# Patient Record
Sex: FEMALE | Race: OTHER | HISPANIC OR LATINO
[De-identification: names, ages, dates, MRNs, and addresses within clinical notes are randomized per-mention and may not be internally consistent; named-entity substitution may affect disease eponyms.]

---

## 2019-11-04 ENCOUNTER — APPOINTMENT (OUTPATIENT)
Dept: MATERNAL FETAL MEDICINE | Facility: CLINIC | Age: 39
End: 2019-11-04
Payer: COMMERCIAL

## 2019-11-04 ENCOUNTER — APPOINTMENT (OUTPATIENT)
Dept: ANTEPARTUM | Facility: CLINIC | Age: 39
End: 2019-11-04
Payer: COMMERCIAL

## 2019-11-04 ENCOUNTER — ASOB RESULT (OUTPATIENT)
Age: 39
End: 2019-11-04

## 2019-11-04 PROCEDURE — G0108 DIAB MANAGE TRN  PER INDIV: CPT

## 2019-11-04 PROCEDURE — 76811 OB US DETAILED SNGL FETUS: CPT

## 2019-11-12 ENCOUNTER — OUTPATIENT (OUTPATIENT)
Dept: OUTPATIENT SERVICES | Age: 39
LOS: 1 days | Discharge: HOME | End: 2019-11-12

## 2019-11-13 ENCOUNTER — APPOINTMENT (OUTPATIENT)
Dept: PEDIATRIC CARDIOLOGY | Facility: CLINIC | Age: 39
End: 2019-11-13
Payer: COMMERCIAL

## 2019-11-13 PROCEDURE — 99241 OFFICE CONSULTATION NEW/ESTAB PATIENT 15 MIN: CPT | Mod: 25

## 2019-11-13 PROCEDURE — 93325 DOPPLER ECHO COLOR FLOW MAPG: CPT | Mod: 59

## 2019-11-13 PROCEDURE — 76825 ECHO EXAM OF FETAL HEART: CPT

## 2019-11-13 PROCEDURE — 76820 UMBILICAL ARTERY ECHO: CPT

## 2019-11-13 PROCEDURE — 76827 ECHO EXAM OF FETAL HEART: CPT

## 2019-11-25 ENCOUNTER — APPOINTMENT (OUTPATIENT)
Dept: MATERNAL FETAL MEDICINE | Facility: CLINIC | Age: 39
End: 2019-11-25
Payer: COMMERCIAL

## 2019-11-25 ENCOUNTER — ASOB RESULT (OUTPATIENT)
Age: 39
End: 2019-11-25

## 2019-11-25 PROCEDURE — G0108 DIAB MANAGE TRN  PER INDIV: CPT

## 2019-11-25 RX ORDER — METFORMIN HYDROCHLORIDE 850 MG/1
850 TABLET, COATED ORAL TWICE DAILY
Qty: 1 | Refills: 3 | Status: ACTIVE | COMMUNITY
Start: 2019-11-25 | End: 1900-01-01

## 2019-11-25 RX ORDER — INSULIN LISPRO 100 [IU]/ML
100 INJECTION, SOLUTION INTRAVENOUS; SUBCUTANEOUS
Qty: 1 | Refills: 2 | Status: ACTIVE | COMMUNITY
Start: 2019-11-25 | End: 1900-01-01

## 2019-11-25 RX ORDER — PEN NEEDLE, DIABETIC 29 G X1/2"
32G X 4 MM NEEDLE, DISPOSABLE MISCELLANEOUS
Qty: 1 | Refills: 2 | Status: ACTIVE | COMMUNITY
Start: 2019-11-25 | End: 1900-01-01

## 2019-11-25 RX ORDER — BLOOD SUGAR DIAGNOSTIC
STRIP MISCELLANEOUS
Qty: 1 | Refills: 5 | Status: ACTIVE | COMMUNITY
Start: 2019-11-25 | End: 1900-01-01

## 2019-11-27 ENCOUNTER — TRANSCRIPTION ENCOUNTER (OUTPATIENT)
Age: 39
End: 2019-11-27

## 2019-12-09 ENCOUNTER — ASOB RESULT (OUTPATIENT)
Age: 39
End: 2019-12-09

## 2019-12-09 ENCOUNTER — APPOINTMENT (OUTPATIENT)
Dept: ANTEPARTUM | Facility: CLINIC | Age: 39
End: 2019-12-09
Payer: COMMERCIAL

## 2019-12-09 ENCOUNTER — APPOINTMENT (OUTPATIENT)
Dept: MATERNAL FETAL MEDICINE | Facility: CLINIC | Age: 39
End: 2019-12-09
Payer: COMMERCIAL

## 2019-12-09 PROCEDURE — 76816 OB US FOLLOW-UP PER FETUS: CPT

## 2019-12-09 PROCEDURE — G0108 DIAB MANAGE TRN  PER INDIV: CPT

## 2019-12-10 RX ORDER — INSULIN HUMAN 100 [IU]/ML
100 INJECTION, SUSPENSION SUBCUTANEOUS
Qty: 1 | Refills: 4 | Status: ACTIVE | COMMUNITY
Start: 2019-11-25 | End: 1900-01-01

## 2019-12-23 ENCOUNTER — APPOINTMENT (OUTPATIENT)
Dept: MATERNAL FETAL MEDICINE | Facility: CLINIC | Age: 39
End: 2019-12-23
Payer: COMMERCIAL

## 2019-12-23 ENCOUNTER — ASOB RESULT (OUTPATIENT)
Age: 39
End: 2019-12-23

## 2019-12-23 PROCEDURE — G0108 DIAB MANAGE TRN  PER INDIV: CPT

## 2019-12-23 RX ORDER — BLOOD SUGAR DIAGNOSTIC
STRIP MISCELLANEOUS 4 TIMES DAILY
Qty: 1 | Refills: 2 | Status: ACTIVE | COMMUNITY
Start: 2019-12-23 | End: 1900-01-01

## 2020-01-06 ENCOUNTER — APPOINTMENT (OUTPATIENT)
Dept: MATERNAL FETAL MEDICINE | Facility: CLINIC | Age: 40
End: 2020-01-06
Payer: COMMERCIAL

## 2020-01-06 ENCOUNTER — ASOB RESULT (OUTPATIENT)
Age: 40
End: 2020-01-06

## 2020-01-06 ENCOUNTER — APPOINTMENT (OUTPATIENT)
Dept: ANTEPARTUM | Facility: CLINIC | Age: 40
End: 2020-01-06
Payer: COMMERCIAL

## 2020-01-06 ENCOUNTER — RX RENEWAL (OUTPATIENT)
Age: 40
End: 2020-01-06

## 2020-01-06 PROCEDURE — 76816 OB US FOLLOW-UP PER FETUS: CPT

## 2020-01-06 PROCEDURE — G0108 DIAB MANAGE TRN  PER INDIV: CPT

## 2020-01-06 PROCEDURE — 76819 FETAL BIOPHYS PROFIL W/O NST: CPT

## 2020-01-06 RX ORDER — INSULIN HUMAN 100 [IU]/ML
100 INJECTION, SUSPENSION SUBCUTANEOUS
Qty: 2 | Refills: 4 | Status: ACTIVE | COMMUNITY
Start: 2019-12-23 | End: 1900-01-01

## 2020-01-10 ENCOUNTER — MESSAGE (OUTPATIENT)
Age: 40
End: 2020-01-10

## 2020-01-17 ENCOUNTER — APPOINTMENT (OUTPATIENT)
Dept: MATERNAL FETAL MEDICINE | Facility: CLINIC | Age: 40
End: 2020-01-17
Payer: COMMERCIAL

## 2020-01-17 ENCOUNTER — APPOINTMENT (OUTPATIENT)
Dept: ANTEPARTUM | Facility: CLINIC | Age: 40
End: 2020-01-17
Payer: COMMERCIAL

## 2020-01-17 ENCOUNTER — ASOB RESULT (OUTPATIENT)
Age: 40
End: 2020-01-17

## 2020-01-17 ENCOUNTER — OUTPATIENT (OUTPATIENT)
Dept: OUTPATIENT SERVICES | Facility: HOSPITAL | Age: 40
LOS: 1 days | End: 2020-01-17

## 2020-01-17 ENCOUNTER — APPOINTMENT (OUTPATIENT)
Dept: ANTEPARTUM | Facility: HOSPITAL | Age: 40
End: 2020-01-17

## 2020-01-17 PROCEDURE — 76818 FETAL BIOPHYS PROFILE W/NST: CPT | Mod: 26

## 2020-01-17 PROCEDURE — G0108 DIAB MANAGE TRN  PER INDIV: CPT

## 2020-01-22 DIAGNOSIS — O99.842 BARIATRIC SURGERY STATUS COMPLICATING PREGNANCY, SECOND TRIMESTER: ICD-10-CM

## 2020-01-22 DIAGNOSIS — O24.113 PRE-EXISTING TYPE 2 DIABETES MELLITUS, IN PREGNANCY, THIRD TRIMESTER: ICD-10-CM

## 2020-01-22 DIAGNOSIS — O99.212 OBESITY COMPLICATING PREGNANCY, SECOND TRIMESTER: ICD-10-CM

## 2020-01-22 DIAGNOSIS — Z3A.32 32 WEEKS GESTATION OF PREGNANCY: ICD-10-CM

## 2020-01-22 DIAGNOSIS — O09.522 SUPERVISION OF ELDERLY MULTIGRAVIDA, SECOND TRIMESTER: ICD-10-CM

## 2020-01-27 ENCOUNTER — APPOINTMENT (OUTPATIENT)
Dept: ANTEPARTUM | Facility: CLINIC | Age: 40
End: 2020-01-27
Payer: COMMERCIAL

## 2020-01-27 ENCOUNTER — ASOB RESULT (OUTPATIENT)
Age: 40
End: 2020-01-27

## 2020-01-27 ENCOUNTER — APPOINTMENT (OUTPATIENT)
Dept: ANTEPARTUM | Facility: HOSPITAL | Age: 40
End: 2020-01-27

## 2020-01-27 ENCOUNTER — OUTPATIENT (OUTPATIENT)
Dept: OUTPATIENT SERVICES | Facility: HOSPITAL | Age: 40
LOS: 1 days | End: 2020-01-27

## 2020-01-27 PROCEDURE — 76818 FETAL BIOPHYS PROFILE W/NST: CPT | Mod: 26

## 2020-01-29 DIAGNOSIS — O24.113 PRE-EXISTING TYPE 2 DIABETES MELLITUS, IN PREGNANCY, THIRD TRIMESTER: ICD-10-CM

## 2020-01-29 DIAGNOSIS — O99.842 BARIATRIC SURGERY STATUS COMPLICATING PREGNANCY, SECOND TRIMESTER: ICD-10-CM

## 2020-01-29 DIAGNOSIS — O99.212 OBESITY COMPLICATING PREGNANCY, SECOND TRIMESTER: ICD-10-CM

## 2020-01-29 DIAGNOSIS — O09.522 SUPERVISION OF ELDERLY MULTIGRAVIDA, SECOND TRIMESTER: ICD-10-CM

## 2020-02-03 ENCOUNTER — APPOINTMENT (OUTPATIENT)
Dept: MATERNAL FETAL MEDICINE | Facility: CLINIC | Age: 40
End: 2020-02-03

## 2020-02-06 ENCOUNTER — APPOINTMENT (OUTPATIENT)
Dept: ANTEPARTUM | Facility: HOSPITAL | Age: 40
End: 2020-02-06

## 2020-02-06 ENCOUNTER — ASOB RESULT (OUTPATIENT)
Age: 40
End: 2020-02-06

## 2020-02-06 ENCOUNTER — OUTPATIENT (OUTPATIENT)
Dept: OUTPATIENT SERVICES | Facility: HOSPITAL | Age: 40
LOS: 1 days | End: 2020-02-06

## 2020-02-06 ENCOUNTER — APPOINTMENT (OUTPATIENT)
Dept: ANTEPARTUM | Facility: CLINIC | Age: 40
End: 2020-02-06
Payer: COMMERCIAL

## 2020-02-06 ENCOUNTER — APPOINTMENT (OUTPATIENT)
Dept: MATERNAL FETAL MEDICINE | Facility: CLINIC | Age: 40
End: 2020-02-06
Payer: COMMERCIAL

## 2020-02-06 PROCEDURE — 76816 OB US FOLLOW-UP PER FETUS: CPT

## 2020-02-06 PROCEDURE — 76818 FETAL BIOPHYS PROFILE W/NST: CPT | Mod: 26

## 2020-02-06 PROCEDURE — G0108 DIAB MANAGE TRN  PER INDIV: CPT

## 2020-02-10 ENCOUNTER — INPATIENT (INPATIENT)
Facility: HOSPITAL | Age: 40
LOS: 1 days | Discharge: HOME | End: 2020-02-12
Attending: OBSTETRICS & GYNECOLOGY | Admitting: OBSTETRICS & GYNECOLOGY
Payer: COMMERCIAL

## 2020-02-10 VITALS — SYSTOLIC BLOOD PRESSURE: 117 MMHG | DIASTOLIC BLOOD PRESSURE: 61 MMHG | HEART RATE: 94 BPM

## 2020-02-10 DIAGNOSIS — O26.899 OTHER SPECIFIED PREGNANCY RELATED CONDITIONS, UNSPECIFIED TRIMESTER: ICD-10-CM

## 2020-02-10 DIAGNOSIS — O26.90 PREGNANCY RELATED CONDITIONS, UNSPECIFIED, UNSPECIFIED TRIMESTER: ICD-10-CM

## 2020-02-10 DIAGNOSIS — Z3A.00 WEEKS OF GESTATION OF PREGNANCY NOT SPECIFIED: ICD-10-CM

## 2020-02-10 DIAGNOSIS — O03.9 COMPLETE OR UNSPECIFIED SPONTANEOUS ABORTION WITHOUT COMPLICATION: Chronic | ICD-10-CM

## 2020-02-10 DIAGNOSIS — O99.213 OBESITY COMPLICATING PREGNANCY, THIRD TRIMESTER: ICD-10-CM

## 2020-02-10 DIAGNOSIS — Z98.82 BREAST IMPLANT STATUS: Chronic | ICD-10-CM

## 2020-02-10 DIAGNOSIS — O24.113 PRE-EXISTING TYPE 2 DIABETES MELLITUS, IN PREGNANCY, THIRD TRIMESTER: ICD-10-CM

## 2020-02-10 DIAGNOSIS — O99.843 BARIATRIC SURGERY STATUS COMPLICATING PREGNANCY, THIRD TRIMESTER: ICD-10-CM

## 2020-02-10 DIAGNOSIS — O09.523 SUPERVISION OF ELDERLY MULTIGRAVIDA, THIRD TRIMESTER: ICD-10-CM

## 2020-02-10 DIAGNOSIS — Z98.890 OTHER SPECIFIED POSTPROCEDURAL STATES: Chronic | ICD-10-CM

## 2020-02-10 DIAGNOSIS — Z90.49 ACQUIRED ABSENCE OF OTHER SPECIFIED PARTS OF DIGESTIVE TRACT: Chronic | ICD-10-CM

## 2020-02-10 LAB
ALBUMIN SERPL ELPH-MCNC: 3.4 G/DL — SIGNIFICANT CHANGE UP (ref 3.3–5)
ALP SERPL-CCNC: 79 U/L — SIGNIFICANT CHANGE UP (ref 40–120)
ALT FLD-CCNC: 12 U/L — SIGNIFICANT CHANGE UP (ref 4–33)
ANION GAP SERPL CALC-SCNC: 12 MMO/L — SIGNIFICANT CHANGE UP (ref 7–14)
APPEARANCE UR: CLEAR — SIGNIFICANT CHANGE UP
APTT BLD: 25.9 SEC — LOW (ref 27.5–36.3)
AST SERPL-CCNC: 17 U/L — SIGNIFICANT CHANGE UP (ref 4–32)
BASOPHILS # BLD AUTO: 0.02 K/UL — SIGNIFICANT CHANGE UP (ref 0–0.2)
BASOPHILS NFR BLD AUTO: 0.2 % — SIGNIFICANT CHANGE UP (ref 0–2)
BILIRUB SERPL-MCNC: < 0.2 MG/DL — LOW (ref 0.2–1.2)
BILIRUB UR-MCNC: NEGATIVE — SIGNIFICANT CHANGE UP
BLD GP AB SCN SERPL QL: NEGATIVE — SIGNIFICANT CHANGE UP
BLOOD UR QL VISUAL: NEGATIVE — SIGNIFICANT CHANGE UP
BUN SERPL-MCNC: 11 MG/DL — SIGNIFICANT CHANGE UP (ref 7–23)
CALCIUM SERPL-MCNC: 9.7 MG/DL — SIGNIFICANT CHANGE UP (ref 8.4–10.5)
CHLORIDE SERPL-SCNC: 104 MMOL/L — SIGNIFICANT CHANGE UP (ref 98–107)
CO2 SERPL-SCNC: 19 MMOL/L — LOW (ref 22–31)
COLOR SPEC: SIGNIFICANT CHANGE UP
CREAT ?TM UR-MCNC: 43.4 MG/DL — SIGNIFICANT CHANGE UP
CREAT SERPL-MCNC: 0.52 MG/DL — SIGNIFICANT CHANGE UP (ref 0.5–1.3)
EOSINOPHIL # BLD AUTO: 0.18 K/UL — SIGNIFICANT CHANGE UP (ref 0–0.5)
EOSINOPHIL NFR BLD AUTO: 2.1 % — SIGNIFICANT CHANGE UP (ref 0–6)
FIBRINOGEN PPP-MCNC: 731.5 MG/DL — HIGH (ref 350–510)
GLUCOSE BLDC GLUCOMTR-MCNC: 101 MG/DL — HIGH (ref 70–99)
GLUCOSE BLDC GLUCOMTR-MCNC: 105 MG/DL — HIGH (ref 70–99)
GLUCOSE SERPL-MCNC: 90 MG/DL — SIGNIFICANT CHANGE UP (ref 70–99)
GLUCOSE UR-MCNC: NEGATIVE — SIGNIFICANT CHANGE UP
HBV SURFACE AG SER-ACNC: NEGATIVE — SIGNIFICANT CHANGE UP
HCT VFR BLD CALC: 31.4 % — LOW (ref 34.5–45)
HGB BLD-MCNC: 10.6 G/DL — LOW (ref 11.5–15.5)
IMM GRANULOCYTES NFR BLD AUTO: 0.6 % — SIGNIFICANT CHANGE UP (ref 0–1.5)
INR BLD: 1.02 — SIGNIFICANT CHANGE UP (ref 0.88–1.17)
KETONES UR-MCNC: SIGNIFICANT CHANGE UP
LDH SERPL L TO P-CCNC: 144 U/L — SIGNIFICANT CHANGE UP (ref 135–225)
LEUKOCYTE ESTERASE UR-ACNC: NEGATIVE — SIGNIFICANT CHANGE UP
LYMPHOCYTES # BLD AUTO: 2.88 K/UL — SIGNIFICANT CHANGE UP (ref 1–3.3)
LYMPHOCYTES # BLD AUTO: 33.4 % — SIGNIFICANT CHANGE UP (ref 13–44)
MCHC RBC-ENTMCNC: 27.9 PG — SIGNIFICANT CHANGE UP (ref 27–34)
MCHC RBC-ENTMCNC: 33.8 % — SIGNIFICANT CHANGE UP (ref 32–36)
MCV RBC AUTO: 82.6 FL — SIGNIFICANT CHANGE UP (ref 80–100)
MONOCYTES # BLD AUTO: 0.51 K/UL — SIGNIFICANT CHANGE UP (ref 0–0.9)
MONOCYTES NFR BLD AUTO: 5.9 % — SIGNIFICANT CHANGE UP (ref 2–14)
NEUTROPHILS # BLD AUTO: 4.97 K/UL — SIGNIFICANT CHANGE UP (ref 1.8–7.4)
NEUTROPHILS NFR BLD AUTO: 57.8 % — SIGNIFICANT CHANGE UP (ref 43–77)
NITRITE UR-MCNC: NEGATIVE — SIGNIFICANT CHANGE UP
NRBC # FLD: 0 K/UL — SIGNIFICANT CHANGE UP (ref 0–0)
PH UR: 6.5 — SIGNIFICANT CHANGE UP (ref 5–8)
PLATELET # BLD AUTO: 308 K/UL — SIGNIFICANT CHANGE UP (ref 150–400)
PMV BLD: 9.7 FL — SIGNIFICANT CHANGE UP (ref 7–13)
POTASSIUM SERPL-MCNC: 4 MMOL/L — SIGNIFICANT CHANGE UP (ref 3.5–5.3)
POTASSIUM SERPL-SCNC: 4 MMOL/L — SIGNIFICANT CHANGE UP (ref 3.5–5.3)
PROT SERPL-MCNC: 6.9 G/DL — SIGNIFICANT CHANGE UP (ref 6–8.3)
PROT UR-MCNC: 5.9 MG/DL — SIGNIFICANT CHANGE UP
PROT UR-MCNC: NEGATIVE — SIGNIFICANT CHANGE UP
PROTHROM AB SERPL-ACNC: 11.3 SEC — SIGNIFICANT CHANGE UP (ref 9.8–13.1)
RBC # BLD: 3.8 M/UL — SIGNIFICANT CHANGE UP (ref 3.8–5.2)
RBC # FLD: 13.3 % — SIGNIFICANT CHANGE UP (ref 10.3–14.5)
RH IG SCN BLD-IMP: POSITIVE — SIGNIFICANT CHANGE UP
SODIUM SERPL-SCNC: 135 MMOL/L — SIGNIFICANT CHANGE UP (ref 135–145)
SP GR SPEC: 1.01 — SIGNIFICANT CHANGE UP (ref 1–1.04)
URATE SERPL-MCNC: 3.7 MG/DL — SIGNIFICANT CHANGE UP (ref 2.5–7)
UROBILINOGEN FLD QL: NORMAL — SIGNIFICANT CHANGE UP
WBC # BLD: 8.61 K/UL — SIGNIFICANT CHANGE UP (ref 3.8–10.5)
WBC # FLD AUTO: 8.61 K/UL — SIGNIFICANT CHANGE UP (ref 3.8–10.5)

## 2020-02-10 RX ORDER — HUMAN INSULIN 100 [IU]/ML
40 INJECTION, SUSPENSION SUBCUTANEOUS AT BEDTIME
Refills: 0 | Status: DISCONTINUED | OUTPATIENT
Start: 2020-02-10 | End: 2020-02-12

## 2020-02-10 RX ORDER — DEXTROSE 50 % IN WATER 50 %
25 SYRINGE (ML) INTRAVENOUS ONCE
Refills: 0 | Status: DISCONTINUED | OUTPATIENT
Start: 2020-02-10 | End: 2020-02-12

## 2020-02-10 RX ORDER — INSULIN LISPRO 100/ML
14 VIAL (ML) SUBCUTANEOUS
Refills: 0 | Status: DISCONTINUED | OUTPATIENT
Start: 2020-02-10 | End: 2020-02-12

## 2020-02-10 RX ORDER — SODIUM CHLORIDE 9 MG/ML
1000 INJECTION, SOLUTION INTRAVENOUS
Refills: 0 | Status: DISCONTINUED | OUTPATIENT
Start: 2020-02-10 | End: 2020-02-12

## 2020-02-10 RX ORDER — METFORMIN HYDROCHLORIDE 850 MG/1
850 TABLET ORAL
Refills: 0 | Status: DISCONTINUED | OUTPATIENT
Start: 2020-02-10 | End: 2020-02-12

## 2020-02-10 RX ORDER — DEXTROSE 50 % IN WATER 50 %
12.5 SYRINGE (ML) INTRAVENOUS ONCE
Refills: 0 | Status: DISCONTINUED | OUTPATIENT
Start: 2020-02-10 | End: 2020-02-12

## 2020-02-10 RX ORDER — DEXTROSE 50 % IN WATER 50 %
15 SYRINGE (ML) INTRAVENOUS ONCE
Refills: 0 | Status: DISCONTINUED | OUTPATIENT
Start: 2020-02-10 | End: 2020-02-12

## 2020-02-10 RX ORDER — GLUCAGON INJECTION, SOLUTION 0.5 MG/.1ML
1 INJECTION, SOLUTION SUBCUTANEOUS ONCE
Refills: 0 | Status: DISCONTINUED | OUTPATIENT
Start: 2020-02-10 | End: 2020-02-12

## 2020-02-10 RX ADMIN — HUMAN INSULIN 40 UNIT(S): 100 INJECTION, SUSPENSION SUBCUTANEOUS at 22:52

## 2020-02-10 RX ADMIN — Medication 14 UNIT(S): at 20:36

## 2020-02-10 NOTE — OB PROVIDER H&P - NSHPLABSRESULTS_GEN_ALL_CORE
Fingerstick: 80                          10.6   8.61  )-----------( 308      ( 10 Feb 2020 16:10 )             31.4

## 2020-02-10 NOTE — OB RN TRIAGE NOTE - PSH
H/O abdominal surgery  abdominalplasty  History of breast augmentation    History of cholecystectomy    Spontaneous  without complication  d&C2

## 2020-02-10 NOTE — OB PROVIDER H&P - HISTORY OF PRESENT ILLNESS
's patient is a 40 y/o EDC 3/10/2020 EGA 35 6/7  sent for elevated blood pressure in office, 157/90 and 142/90. Patient also reports of increased concern for poor control fasting fingersticks ( today's fasting fingerstick 110.) Patient for admission as per  and for MFM consult    AP complications: AMA  Medical History: Type 2 Diabetes controlled with Metformin and insulin, diagnosed 10 years ago  Surgical History: Abdominplasty 10 years ago, Cholecystectomy     Medications:   Metformin 850 mg, twice a day  Humalog 14 units/before each meal  NPH 40 units/ at hour of sleep    OBGYN History: SAB x 2, complete

## 2020-02-10 NOTE — OB PROVIDER H&P - ASSESSMENT
's patient is a 40 y/o EDC 3/10/2020 EGA 35 6/7  sent for elevated blood pressure in office, 157/90 and 142/90. Patient also reports of increased concern for poor control fasting fingersticks ( today's fasting fingerstick 110.) Patient for admission as per  and for MFM consult.    AP complications: AMA  ATU scan 2020: cephalic presentation, posterior placenta, STEPHEN 14.93, 2812 EFW, 8/8 BPP  Medical History: Type 2 Diabetes controlled with Metformin and insulin, diagnosed 10 years ago  Surgical History: abdominoplasty 10 years ago, cholecystectomy, breast augmentation     Medications:   Metformin 850 mg, twice a day  Humalog 14 units/before each meal  NPH 40 units/ at hour of sleep    OBGYN History: SAB x 2, complete    Vital Signs Last 24 Hrs  Fingerstick: 80  T(C): 36.7 (10 Feb 2020 17:01), Max: 36.7 (10 Feb 2020 17:01)  T(F): 98.06 (10 Feb 2020 17:01), Max: 98.06 (10 Feb 2020 17:01)  HR: 90 (10 Feb 2020 17:09) (90 - 94)  BP: 100/56 (10 Feb 2020 17:09) (100/56 - 117/61)  Lungs: clear to auscultation  Heart: regular rate and rhythm    Admit to antepartum unit for 24 hour urine collection and glucose monitoring as per .  - admit to antepartum  - HELLP labs ordered  - MFM notified patient is admitted  - discussed with Dr. Cerrato and  patient's antepartum admission

## 2020-02-10 NOTE — OB PROVIDER TRIAGE NOTE - PSH
H/O abdominal surgery  abdominalplasty  History of breast augmentation    History of cholecystectomy

## 2020-02-10 NOTE — OB PROVIDER TRIAGE NOTE - NSHPPHYSICALEXAM_GEN_ALL_CORE
Vital Signs Last 24 Hrs  T(C): 36.7 (10 Feb 2020 17:01), Max: 36.7 (10 Feb 2020 17:01)  T(F): 98.06 (10 Feb 2020 17:01), Max: 98.06 (10 Feb 2020 17:01)  HR: 90 (10 Feb 2020 17:09) (90 - 94)  BP: 100/56 (10 Feb 2020 17:09) (100/56 - 117/61)  Lungs: clear to auscultation  Heart: regular rate and rhythm

## 2020-02-10 NOTE — OB PROVIDER TRIAGE NOTE - NSANTENATALSTERA_OBGYN_ALL_OB
[address] from pt's PCP office called to inform patient does not require a referral per MD is in network No

## 2020-02-10 NOTE — OB PROVIDER TRIAGE NOTE - HISTORY OF PRESENT ILLNESS
's patient is a 38 y/o EDC 3/10/2020 EGA 35 6/7  sent for elevated blood pressure in office, 157/90 and 142/90. Patient also reports of increased concern for poor control fasting fingersticks ( today's fasting fingerstick 110.) Patient for admission as per  and for MFM consult    AP complications: AMA  Medical History: Type 2 Diabetes controlled with Metformin and insulin, diagnosed 10 years ago  Surgical History: Abdominplasty 10 years ago, Cholecystectomy     Medications:   Metformin 850 mg, twice a day  Humalog 14 units/before each meal  NPH 40 units/ at hour of sleep    OBGYN History: SAB x 2, complete

## 2020-02-10 NOTE — OB PROVIDER TRIAGE NOTE - NSOBPROVIDERNOTE_OBGYN_ALL_OB_FT
's patient is a 40 y/o EDC 3/10/2020 EGA 35 6/7  sent for elevated blood pressure in office, 157/90 and 142/90. Patient also reports of increased concern for poor control fasting fingersticks ( today's fasting fingerstick 110.) Patient for admission as per  and for MFM consult    AP complications: AMA  ATU scan 2020: cephalic presentation, posterior placenta, STEPHEN 14.93, 2812 EFW, 8/ BPP  Medical History: Type 2 Diabetes controlled with Metformin and insulin, diagnosed 10 years ago  Surgical History: Abdominplasty 10 years ago, Cholecystectomy     Medications:   Metformin 850 mg, twice a day  Humalog 14 units/before each meal  NPH 40 units/ at hour of sleep    OBGYN History: SAB x 2, complete    Vital Signs Last 24 Hrs  T(C): 36.7 (10 Feb 2020 17:01), Max: 36.7 (10 Feb 2020 17:01)  T(F): 98.06 (10 Feb 2020 17:01), Max: 98.06 (10 Feb 2020 17:01)  HR: 90 (10 Feb 2020 17:09) (90 - 94)  BP: 100/56 (10 Feb 2020 17:09) (100/56 - 117/61)  Lungs: clear to auscultation  Heart: regular rate and rhythm    Admit to antepartum unit for 24 hour urine collection and glucose monitoring as per .  - admit to antepartum  - HELLP labs ordered  - MFM notified patient is admitted

## 2020-02-10 NOTE — OB PROVIDER H&P - PROBLEM SELECTOR PLAN 1
Admit to antepartum unit for 24 hour urine collection and glucose monitoring as per .  - admit to antepartum  - HELLP labs ordered  - M notified patient is admitted  - discussed with Dr. Cerrato and  patient's antepartum admission

## 2020-02-11 ENCOUNTER — OUTPATIENT (OUTPATIENT)
Dept: OUTPATIENT SERVICES | Facility: HOSPITAL | Age: 40
LOS: 1 days | End: 2020-02-11

## 2020-02-11 ENCOUNTER — TRANSCRIPTION ENCOUNTER (OUTPATIENT)
Age: 40
End: 2020-02-11

## 2020-02-11 ENCOUNTER — ASOB RESULT (OUTPATIENT)
Age: 40
End: 2020-02-11

## 2020-02-11 ENCOUNTER — APPOINTMENT (OUTPATIENT)
Dept: ANTEPARTUM | Facility: CLINIC | Age: 40
End: 2020-02-11
Payer: COMMERCIAL

## 2020-02-11 DIAGNOSIS — Z90.49 ACQUIRED ABSENCE OF OTHER SPECIFIED PARTS OF DIGESTIVE TRACT: Chronic | ICD-10-CM

## 2020-02-11 DIAGNOSIS — Z98.82 BREAST IMPLANT STATUS: Chronic | ICD-10-CM

## 2020-02-11 DIAGNOSIS — Z98.890 OTHER SPECIFIED POSTPROCEDURAL STATES: Chronic | ICD-10-CM

## 2020-02-11 DIAGNOSIS — O24.913 UNSPECIFIED DIABETES MELLITUS IN PREGNANCY, THIRD TRIMESTER: ICD-10-CM

## 2020-02-11 DIAGNOSIS — Z3A.36 36 WEEKS GESTATION OF PREGNANCY: ICD-10-CM

## 2020-02-11 DIAGNOSIS — O03.9 COMPLETE OR UNSPECIFIED SPONTANEOUS ABORTION WITHOUT COMPLICATION: Chronic | ICD-10-CM

## 2020-02-11 LAB
ALBUMIN SERPL ELPH-MCNC: 3.7 G/DL — SIGNIFICANT CHANGE UP (ref 3.3–5)
ALP SERPL-CCNC: 86 U/L — SIGNIFICANT CHANGE UP (ref 40–120)
ALT FLD-CCNC: 11 U/L — SIGNIFICANT CHANGE UP (ref 4–33)
ANION GAP SERPL CALC-SCNC: 12 MMO/L — SIGNIFICANT CHANGE UP (ref 7–14)
APTT BLD: 25.3 SEC — LOW (ref 27.5–36.3)
AST SERPL-CCNC: 14 U/L — SIGNIFICANT CHANGE UP (ref 4–32)
BASOPHILS # BLD AUTO: 0.02 K/UL — SIGNIFICANT CHANGE UP (ref 0–0.2)
BASOPHILS NFR BLD AUTO: 0.2 % — SIGNIFICANT CHANGE UP (ref 0–2)
BILIRUB SERPL-MCNC: < 0.2 MG/DL — LOW (ref 0.2–1.2)
BUN SERPL-MCNC: 14 MG/DL — SIGNIFICANT CHANGE UP (ref 7–23)
CALCIUM SERPL-MCNC: 9.6 MG/DL — SIGNIFICANT CHANGE UP (ref 8.4–10.5)
CHLORIDE SERPL-SCNC: 102 MMOL/L — SIGNIFICANT CHANGE UP (ref 98–107)
CO2 SERPL-SCNC: 20 MMOL/L — LOW (ref 22–31)
CREAT SERPL-MCNC: 0.6 MG/DL — SIGNIFICANT CHANGE UP (ref 0.5–1.3)
EOSINOPHIL # BLD AUTO: 0.2 K/UL — SIGNIFICANT CHANGE UP (ref 0–0.5)
EOSINOPHIL NFR BLD AUTO: 2.5 % — SIGNIFICANT CHANGE UP (ref 0–6)
FIBRINOGEN PPP-MCNC: 890.6 MG/DL — HIGH (ref 350–510)
GLUCOSE BLDC GLUCOMTR-MCNC: 110 MG/DL — HIGH (ref 70–99)
GLUCOSE BLDC GLUCOMTR-MCNC: 115 MG/DL — HIGH (ref 70–99)
GLUCOSE BLDC GLUCOMTR-MCNC: 122 MG/DL — HIGH (ref 70–99)
GLUCOSE BLDC GLUCOMTR-MCNC: 154 MG/DL — HIGH (ref 70–99)
GLUCOSE BLDC GLUCOMTR-MCNC: 98 MG/DL — SIGNIFICANT CHANGE UP (ref 70–99)
GLUCOSE SERPL-MCNC: 169 MG/DL — HIGH (ref 70–99)
HBA1C BLD-MCNC: 5.9 % — HIGH (ref 4–5.6)
HCT VFR BLD CALC: 32.8 % — LOW (ref 34.5–45)
HGB BLD-MCNC: 10.7 G/DL — LOW (ref 11.5–15.5)
IMM GRANULOCYTES NFR BLD AUTO: 0.6 % — SIGNIFICANT CHANGE UP (ref 0–1.5)
INR BLD: 1.04 — SIGNIFICANT CHANGE UP (ref 0.88–1.17)
LDH SERPL L TO P-CCNC: 116 U/L — LOW (ref 135–225)
LYMPHOCYTES # BLD AUTO: 2.58 K/UL — SIGNIFICANT CHANGE UP (ref 1–3.3)
LYMPHOCYTES # BLD AUTO: 31.9 % — SIGNIFICANT CHANGE UP (ref 13–44)
MCHC RBC-ENTMCNC: 27.6 PG — SIGNIFICANT CHANGE UP (ref 27–34)
MCHC RBC-ENTMCNC: 32.6 % — SIGNIFICANT CHANGE UP (ref 32–36)
MCV RBC AUTO: 84.8 FL — SIGNIFICANT CHANGE UP (ref 80–100)
MONOCYTES # BLD AUTO: 0.37 K/UL — SIGNIFICANT CHANGE UP (ref 0–0.9)
MONOCYTES NFR BLD AUTO: 4.6 % — SIGNIFICANT CHANGE UP (ref 2–14)
NEUTROPHILS # BLD AUTO: 4.88 K/UL — SIGNIFICANT CHANGE UP (ref 1.8–7.4)
NEUTROPHILS NFR BLD AUTO: 60.2 % — SIGNIFICANT CHANGE UP (ref 43–77)
NRBC # FLD: 0 K/UL — SIGNIFICANT CHANGE UP (ref 0–0)
PLATELET # BLD AUTO: 313 K/UL — SIGNIFICANT CHANGE UP (ref 150–400)
PMV BLD: 9.9 FL — SIGNIFICANT CHANGE UP (ref 7–13)
POTASSIUM SERPL-MCNC: 3.6 MMOL/L — SIGNIFICANT CHANGE UP (ref 3.5–5.3)
POTASSIUM SERPL-SCNC: 3.6 MMOL/L — SIGNIFICANT CHANGE UP (ref 3.5–5.3)
PROT SERPL-MCNC: 7.1 G/DL — SIGNIFICANT CHANGE UP (ref 6–8.3)
PROTHROM AB SERPL-ACNC: 11.6 SEC — SIGNIFICANT CHANGE UP (ref 9.8–13.1)
RBC # BLD: 3.87 M/UL — SIGNIFICANT CHANGE UP (ref 3.8–5.2)
RBC # FLD: 13.2 % — SIGNIFICANT CHANGE UP (ref 10.3–14.5)
RUBV IGG SER-ACNC: 7.4 INDEX — SIGNIFICANT CHANGE UP
RUBV IGG SER-IMP: POSITIVE — SIGNIFICANT CHANGE UP
SODIUM SERPL-SCNC: 134 MMOL/L — LOW (ref 135–145)
T PALLIDUM AB TITR SER: NEGATIVE — SIGNIFICANT CHANGE UP
URATE SERPL-MCNC: 4.2 MG/DL — SIGNIFICANT CHANGE UP (ref 2.5–7)
WBC # BLD: 8.1 K/UL — SIGNIFICANT CHANGE UP (ref 3.8–10.5)
WBC # FLD AUTO: 8.1 K/UL — SIGNIFICANT CHANGE UP (ref 3.8–10.5)

## 2020-02-11 PROCEDURE — 99254 IP/OBS CNSLTJ NEW/EST MOD 60: CPT

## 2020-02-11 PROCEDURE — 76819 FETAL BIOPHYS PROFIL W/O NST: CPT | Mod: 26

## 2020-02-11 RX ORDER — FAMOTIDINE 10 MG/ML
20 INJECTION INTRAVENOUS DAILY
Refills: 0 | Status: DISCONTINUED | OUTPATIENT
Start: 2020-02-11 | End: 2020-02-11

## 2020-02-11 RX ORDER — HUMAN INSULIN 100 [IU]/ML
40 INJECTION, SUSPENSION SUBCUTANEOUS
Qty: 0 | Refills: 0 | DISCHARGE
Start: 2020-02-11

## 2020-02-11 RX ORDER — FAMOTIDINE 10 MG/ML
20 INJECTION INTRAVENOUS
Refills: 0 | Status: DISCONTINUED | OUTPATIENT
Start: 2020-02-11 | End: 2020-02-12

## 2020-02-11 RX ADMIN — Medication 14 UNIT(S): at 17:25

## 2020-02-11 RX ADMIN — Medication 14 UNIT(S): at 12:18

## 2020-02-11 RX ADMIN — METFORMIN HYDROCHLORIDE 850 MILLIGRAM(S): 850 TABLET ORAL at 08:27

## 2020-02-11 RX ADMIN — HUMAN INSULIN 40 UNIT(S): 100 INJECTION, SUSPENSION SUBCUTANEOUS at 22:05

## 2020-02-11 RX ADMIN — METFORMIN HYDROCHLORIDE 850 MILLIGRAM(S): 850 TABLET ORAL at 17:25

## 2020-02-11 RX ADMIN — Medication 1 TABLET(S): at 11:04

## 2020-02-11 RX ADMIN — FAMOTIDINE 20 MILLIGRAM(S): 10 INJECTION INTRAVENOUS at 11:52

## 2020-02-11 RX ADMIN — Medication 14 UNIT(S): at 08:19

## 2020-02-11 NOTE — PROGRESS NOTE ADULT - SUBJECTIVE AND OBJECTIVE BOX
R3 Antepartum Note  HD#2     Patient seen and examined at bedside, no acute overnight events. No acute complaints.     Pt reports +FM. Denies LOF, VB, ctx, HA, epigastric pain, blurred vision, CP, SOB, N/V, fevers, and chills.    Vital Signs Last 24 Hours  T(C): 36.7 (02-11-20 @ 05:55), Max: 37 (02-11-20 @ 01:13)  HR: 88 (02-11-20 @ 05:55) (84 - 98)  BP: 91/58 (02-11-20 @ 05:55) (91/58 - 121/64)  RR: 17 (02-11-20 @ 05:55) (15 - 18)  SpO2: 97% (02-11-20 @ 05:55) (95% - 97%)    CAPILLARY BLOOD GLUCOSE    POCT Blood Glucose.: 101 mg/dL (10 Feb 2020 22:34)  POCT Blood Glucose.: 105 mg/dL (10 Feb 2020 20:31)      Physical Exam:  General: NAD  CV: RRR  Pulm: CTAB  Abdomen: Soft, non-tender, gravid  Ext: No pain or swelling    Labs:             10.6   8.61  )-----------( 308      ( 02-10 @ 16:10 )             31.4     02-10 @ 16:10    135  |  104  |  11  ----------------------------<  90  4.0   |  19  |  0.52    Ca    9.7      02-10 @ 16:10    TPro  6.9  /  Alb  3.4  /  TBili  < 0.2  /  DBili  x   /  AST  17  /  ALT  12  /  AlkPhos  79  02-10 @ 16:10    PT/INR - ( 02-10 @ 16:10 )   PT: 11.3 SEC;   INR: 1.02     PTT - ( 02-10 @ 16:10 )  PTT:25.9 SEC    Uric Acid: (02-10 @ 16:10)  3.7      Fibrinogen: (02-10 @ 16:10)  731.5    LDH: (02-10 @ 16:10)  144        MEDICATIONS  (STANDING):  dextrose 5%. 1000 milliLiter(s) (50 mL/Hr) IV Continuous <Continuous>  dextrose 50% Injectable 12.5 Gram(s) IV Push once  dextrose 50% Injectable 25 Gram(s) IV Push once  dextrose 50% Injectable 25 Gram(s) IV Push once  insulin lispro Injectable (HumaLOG) 14 Unit(s) SubCutaneous three times a day before meals  insulin NPH human recombinant 40 Unit(s) SubCutaneous at bedtime  metFORMIN 850 milliGRAM(s) Oral two times a day  prenatal multivitamin 1 Tablet(s) Oral daily    MEDICATIONS  (PRN):  dextrose 40% Gel 15 Gram(s) Oral once PRN Blood Glucose LESS THAN 70 milliGRAM(s)/deciliter  glucagon  Injectable 1 milliGRAM(s) IntraMuscular once PRN Glucose LESS THAN 70 milligrams/deciliter

## 2020-02-11 NOTE — DISCHARGE NOTE ANTEPARTUM - CARE PLAN
Principal Discharge DX:	Diabetes mellitus affecting pregnancy in third trimester  Goal:	euglycemia/normotensive  Assessment and plan of treatment:	-Take blood pressure with at home cuff; if BP is >140/90 call MD  - Return to hospital with headaches, visual changes, abdominal pain, nausea, vomiting, chest pain or shortness of breathe  - Continue to monitor blood glucose and continue insulin as prescribed  - Return with contractions, vaginal bleeding, leaking fluid or decreased fetal movement  - Follow up with OB in 2 days for BP check Principal Discharge DX:	Diabetes mellitus affecting pregnancy in third trimester  Goal:	euglycemia/normotensive  Assessment and plan of treatment:	-Take blood pressure with at home cuff; if BP is >140/90 call MD  - Return to hospital with headaches, visual changes, abdominal pain, nausea, vomiting, chest pain or shortness of breathe  - Continue to monitor blood glucose and keep log to bring to OB and continue insulin as prescribed  - Return with contractions, vaginal bleeding, leaking fluid or decreased fetal movement  - Follow up with OB in 2 days for BP check

## 2020-02-11 NOTE — CONSULT NOTE ADULT - ATTENDING COMMENTS
I saw and counseled Ms. Zimmer and her partner at bedside, agree with excellent Fellow assessment and plan as above.   Will continue inpatient observation and 24 hour urine collection for protein.   She was counseled to take her blood pressure at home daily if she remains so clinically stable until tomorrow and knows that elevated systolic above 140mmHg or diastolic above 90 mmHg is concerning. If she has no further elevated blood pressures, delivery at her planned 38 weeks is reasonable, if she meets diagnostic criteria for gestational hypertension or preeclampsia would recommend delivery at 37 weeks.   Will continue current insulin regimen and we reviewed some dietary choices and eating habits that may help her control her fingerstick glucose values.     All questions were answered to her apparent satisfaction.   Yadira Spivey MD

## 2020-02-11 NOTE — PROGRESS NOTE ADULT - ASSESSMENT
40yo  at 36w0d presenting from Dr. Khalil office with elevated BPs, systolic at 150s, and elevated fasting FS, known T2DM, in stable condition.

## 2020-02-11 NOTE — DISCHARGE NOTE ANTEPARTUM - PATIENT PORTAL LINK FT
You can access the FollowMyHealth Patient Portal offered by [facility] by registering at the following website: [email or web address]. By joining [facility]’s FollowMyHealth portal, you will also be able to view your health information using other applications (apps) compatible with our system.

## 2020-02-11 NOTE — DISCHARGE NOTE ANTEPARTUM - PLAN OF CARE
euglycemia/normotensive -Take blood pressure with at home cuff; if BP is >140/90 call MD  - Return to hospital with headaches, visual changes, abdominal pain, nausea, vomiting, chest pain or shortness of breathe  - Continue to monitor blood glucose and continue insulin as prescribed  - Return with contractions, vaginal bleeding, leaking fluid or decreased fetal movement  - Follow up with OB in 2 days for BP check -Take blood pressure with at home cuff; if BP is >140/90 call MD  - Return to hospital with headaches, visual changes, abdominal pain, nausea, vomiting, chest pain or shortness of breathe  - Continue to monitor blood glucose and keep log to bring to OB and continue insulin as prescribed  - Return with contractions, vaginal bleeding, leaking fluid or decreased fetal movement  - Follow up with OB in 2 days for BP check

## 2020-02-11 NOTE — DISCHARGE NOTE ANTEPARTUM - CARE PROVIDER_API CALL
Summer Khalil)  Obstetrics and Gynecology  [address] [place] [address]  Phone: [phone]  Fax: [phone]  Follow Up Time:

## 2020-02-11 NOTE — CONSULT NOTE ADULT - ASSESSMENT
40yo  @ 36+0 WGA with T2DM, and single elevated BP.  The patient does not meet criteria for preeclampsia or gestational hypertension at this time. If she should have any further elevations above ZEB600 or DBP 90, she would meet criteria and we would recommend delivery at 37 weeks. At this time there is no indication to change her planned date of induction.  Her blood glucose appears moderately well controlled, with only 2 fasting outliers in the last week, though a log to confirm this is not available. This morning her fasting glucose was 85 and we would not recommend regimen changes at this time, but continued monitoring, with uptitration as needed for consistent elevations, is reasonable.   We discussed our findings with the patient and house staff. Please do not hesitate to contact us if you have any further questions.    MD MIRTHA Quintanilla Fellow    Seen and evalauted w JITENDRA SolisM attending, and Kurt, PGY3 38yo  @ 36+0 WGA with T2DM, and single elevated BP.  The patient does not meet criteria for preeclampsia or gestational hypertension at this time. If she should have any further elevations above JQU502 or DBP 90, she would meet criteria and we would recommend delivery at 37 weeks. At this time there is no indication to change her planned date of induction. We have recommended home blood pressure monitoring, and have instructed her to take her blood pressure twice daily at home, and to call her doctor with any SBPs over 140, or DBPs over 90. She understands even a single finding of elevated blood rpessure would be an indication to deliver at 37 weeks gestation.    Her blood glucose appears moderately well controlled, with only 2 fasting outliers in the last week, though a log to confirm this is not available. This morning her fasting glucose was 85 and we would not recommend regimen changes at this time, but continued monitoring, with uptitration as needed for consistent elevations, is reasonable.     We have recommended standing famotidine twice daily to help control her reflux, with a switch to pantoprazole if that does not control her symptoms. If it progresses to full nausea or emesis she should let her doctor know.     We discussed our findings with the patient and house staff. Please do not hesitate to contact us if you have any further questions.    MD MIRTHA Quintanilla Fellow    Seen and evalauted w MIRTHA Solis attending, and Kurt, PGY3

## 2020-02-11 NOTE — PROGRESS NOTE ADULT - PROBLEM SELECTOR PLAN 1
Neuro: No analgesics required   CV: Hemodynamically stable. Continue to monitor BPs. AM HELLP labs pending. 24 hour urine protein collection in progress.   Pulm: O2 sat WNL on RA  GI: Tolerating regular ADA diet   : Voiding spontaneously  Endo: Continue on home metformin 850mg BID, NPH 40u qHS and humalog 14/14/14 with meals. Continue to monitor FS.   Heme: SCDs while in bed  ID: Afebrile, No signs of infection  Dispo: Continue 24 hour urine protein collection, AM HELLP labs pending, continue to monitor FS.     LEIDY Hicks, PGY-3

## 2020-02-11 NOTE — CONSULT NOTE ADULT - SUBJECTIVE AND OBJECTIVE BOX
Patient is a 39y old  Female who presents with a chief complaint of elevated BP in the office (x2, 20 minutes apart) and elevated fasting blood glucose x2 days    HPI:  's patient is a 40 y/o 36 + 0/7 ([facility] 3/10/2020)  sent for elevated blood pressure in office, 157/90 and 142/90, measured 20 minutes apart. She has had no other elevated pressures in this pregnancy nor outside of pregnancy. She does not take her BPs at home. She denies HA, visual changes, n/v, SOB, CP, abd or epigastric pain, no excessive edema. Patient also reports elevated fasting fingersticks for 2 days, previously normal, and with normal postprandial glucose. She has made no recent changes to her insulin regimen.    AP complications: AMA  Medical History: Type 2 Diabetes controlled with Metformin only outside of pregnancy, and insulin in pregnancy, diagnosed 10 years ago. Past 5 days, pt reports fastings of approximately (log not available) 80,90,80,100,110,85(today). Reports normal postprandials.  Surgical History: Abdominoplasty 10 years ago, Cholecystectomy     Medications:   Metformin 850 mg, twice a day  Humalog 14 units/before each meal  NPH 40 units/ at hour of sleep    OBGYN History: SAB x 2, complete no live births    PAST MEDICAL & SURGICAL HISTORY:  Spontaneous  without complication: via D&C  History of breast augmentation  History of cholecystectomy, laparoscopic  H/O abdominoplasty    MEDICATIONS  (STANDING):  famotidine    Tablet 20 milliGRAM(s) Oral two times a day  insulin lispro Injectable (HumaLOG) 14 Unit(s) SubCutaneous three times a day before meals  insulin NPH human recombinant 40 Unit(s) SubCutaneous at bedtime  metFORMIN 850 milliGRAM(s) Oral two times a day    Vital Signs Last 24 Hrs  T(C): 36.7 (2020 09:57), Max: 37 (2020 01:13)  HR: 102 (2020 09:57) (84 - 102)  BP: 109/68 (2020 09:57) (91/58 - 121/64)  RR: 16 (2020 09:57) (15 - 18)  SpO2: 98% (2020 09:57) (95% - 98%)    PHYSICAL EXAM:  Constitutional:  NAD  Respiratory:  No resp distress  Cardiovascular:  RRR  Gastrointestinal:  Abd soft, NT, gravid  Genitourinary:  Not repeated  Extremities:  NT non edematous      LABORATORY:                      10.7   8.10  )-----------( 313      ( 2020 09:36 )             32.8     134<L>  |  102  |  14  ----------------------------<  169<H>  3.6   |  20<L>  |  0.60    Ca    9.6      2020 09:36    TPro  7.1  /  Alb  3.7  /  TBili  < 0.2<L>  /  DBili  x   /  AST  14  /  ALT  11  /  AlkPhos  86  02-11    PT/INR - ( 2020 09:36 )   PT: 11.6 SEC;   INR: 1.04       PTT - ( 2020 09:36 )  PTT:25.3 SEC  Urinalysis Basic - ( 10 Feb 2020 16:10 )    Color: LIGHT YELLOW / Appearance: CLEAR / S.014 / pH: 6.5  Gluc: NEGATIVE / Ketone: TRACE  / Bili: NEGATIVE / Urobili: NORMAL   Blood: NEGATIVE / Protein: NEGATIVE / Nitrite: NEGATIVE   Leuk Esterase: NEGATIVE / RBC: x / WBC x   Sq Epi: x / Non Sq Epi: x / Bacteria: x Patient is a 39y old  Female who presents with a chief complaint of elevated BP in the office (x2, 20 minutes apart) and elevated fasting blood glucose x2 days    HPI:  's patient is a 40 y/o 36 + 0/7 ([facility] 3/10/2020)  sent for elevated blood pressure in office, 157/90 and 142/90, measured 20 minutes apart. She has had no other elevated pressures in this pregnancy nor outside of pregnancy. She does not take her BPs at home. She denies HA, visual changes, n/v, SOB, CP, abd or epigastric pain, no excessive edema. Patient also reports elevated fasting fingersticks for 2 days, previously normal, and with normal postprandial glucose. She has made no recent changes to her insulin regimen. She does describe some acid reflux, controlled with pepcid at home.    AP complications: AMA  Medical History: Type 2 Diabetes controlled with Metformin only outside of pregnancy, and insulin in pregnancy, diagnosed 10 years ago. Past 5 days, pt reports fastings of approximately (log not available) 80,90,80,100,110,85(today). Reports normal postprandials.  Surgical History: Abdominoplasty 10 years ago, Cholecystectomy     Medications:   Metformin 850 mg, twice a day  Humalog 14 units/before each meal  NPH 40 units/ at hour of sleep    OBGYN History: SAB x 2, complete no live births    PAST MEDICAL & SURGICAL HISTORY:  Spontaneous  without complication: via D&C  History of breast augmentation  History of cholecystectomy, laparoscopic  H/O abdominoplasty    MEDICATIONS  (STANDING):  famotidine    Tablet 20 milliGRAM(s) Oral two times a day  insulin lispro Injectable (HumaLOG) 14 Unit(s) SubCutaneous three times a day before meals  insulin NPH human recombinant 40 Unit(s) SubCutaneous at bedtime  metFORMIN 850 milliGRAM(s) Oral two times a day    Vital Signs Last 24 Hrs  T(C): 36.7 (2020 09:57), Max: 37 (2020 01:13)  HR: 102 (2020 09:57) (84 - 102)  BP: 109/68 (2020 09:57) (91/58 - 121/64)  RR: 16 (2020 09:57) (15 - 18)  SpO2: 98% (2020 09:57) (95% - 98%)    PHYSICAL EXAM:  Constitutional:  NAD  Respiratory:  No resp distress  Cardiovascular:  RRR  Gastrointestinal:  Abd soft, NT, gravid  Genitourinary:  Not repeated  Extremities:  NT non edematous      LABORATORY:                      10.7   8.10  )-----------( 313      ( 2020 09:36 )             32.8     134<L>  |  102  |  14  ----------------------------<  169<H>  3.6   |  20<L>  |  0.60    Ca    9.6      2020 09:36    TPro  7.1  /  Alb  3.7  /  TBili  < 0.2<L>  /  DBili  x   /  AST  14  /  ALT  11  /  AlkPhos  86  02-11    PT/INR - ( 2020 09:36 )   PT: 11.6 SEC;   INR: 1.04       PTT - ( 2020 09:36 )  PTT:25.3 SEC  Urinalysis Basic - ( 10 Feb 2020 16:10 )    Color: LIGHT YELLOW / Appearance: CLEAR / S.014 / pH: 6.5  Gluc: NEGATIVE / Ketone: TRACE  / Bili: NEGATIVE / Urobili: NORMAL   Blood: NEGATIVE / Protein: NEGATIVE / Nitrite: NEGATIVE   Leuk Esterase: NEGATIVE / RBC: x / WBC x   Sq Epi: x / Non Sq Epi: x / Bacteria: x

## 2020-02-11 NOTE — DISCHARGE NOTE ANTEPARTUM - MEDICATION SUMMARY - MEDICATIONS TO TAKE
I will START or STAY ON the medications listed below when I get home from the hospital:    metFORMIN 850 mg oral tablet  -- BID  -- Indication: For DM    insulin  -- humolog 14 units 3xday  humulin at hs 40units  -- Indication: For DM    insulin isophane (NPH) 100 units/mL human recombinant subcutaneous suspension  -- 40 unit(s) subcutaneous once a day (at bedtime)  -- Indication: For DM    Prenatal 1 Plus 1 oral tablet  -- 1 tab(s) by mouth once a day  -- Indication: For pregnancy

## 2020-02-11 NOTE — DISCHARGE NOTE ANTEPARTUM - HOSPITAL COURSE
38yo  at 36w0d presenting from Dr. Khalil office with elevated BPs, systolic at 150s, and elevated fasting FS, known T2DM, in stable condition. Fasting glucose 89. 38yo  at 36w1d admitted from Dr. Khalil office with elevated BPs, systolic at 150s, and elevated fasting FS, known T2DM. 24 hour urine protein collected since admission; resulted at 468. BPs all normotensive since admission, and glucose fasting and postprandial WNL on current insulin regimen. M consulted on patient:  "At this time there is no indication to change her planned date of induction. We have recommended home blood pressure monitoring, and have instructed her to take her blood pressure twice daily at home, and to call her doctor with any SBPs over 140, or DBPs over 90. She understands even a single finding of elevated blood pressure would be an indication to deliver at 37 weeks gestation.Her blood glucose appears moderately well controlled, with only 2 fasting outliers in the last week, though a log to confirm this is not available. This morning her fasting glucose was 85 and we would not recommend regimen changes at this time, but continued monitoring, with uptitration as needed for consistent elevations, is reasonable." ATU sono : cephalic/ posterior placenta/ STEPHEN 10.8 BPP 8/8. Patient understands all [facility] recommendations and is stable for discharge home with BP monitoring and glucose monitoring and logging at home with close outpatient follow up with OB in 2 days and induction of labor at 38 wks per OB.

## 2020-02-12 VITALS
OXYGEN SATURATION: 97 % | TEMPERATURE: 99 F | SYSTOLIC BLOOD PRESSURE: 108 MMHG | HEART RATE: 95 BPM | RESPIRATION RATE: 18 BRPM | DIASTOLIC BLOOD PRESSURE: 72 MMHG

## 2020-02-12 LAB
ALBUMIN SERPL ELPH-MCNC: 3.1 G/DL — LOW (ref 3.3–5)
ALP SERPL-CCNC: 74 U/L — SIGNIFICANT CHANGE UP (ref 40–120)
ALT FLD-CCNC: 10 U/L — SIGNIFICANT CHANGE UP (ref 4–33)
ANION GAP SERPL CALC-SCNC: 12 MMO/L — SIGNIFICANT CHANGE UP (ref 7–14)
APTT BLD: 26.5 SEC — LOW (ref 27.5–36.3)
AST SERPL-CCNC: 11 U/L — SIGNIFICANT CHANGE UP (ref 4–32)
BASOPHILS # BLD AUTO: 0.03 K/UL — SIGNIFICANT CHANGE UP (ref 0–0.2)
BASOPHILS NFR BLD AUTO: 0.4 % — SIGNIFICANT CHANGE UP (ref 0–2)
BILIRUB SERPL-MCNC: < 0.2 MG/DL — LOW (ref 0.2–1.2)
BUN SERPL-MCNC: 12 MG/DL — SIGNIFICANT CHANGE UP (ref 7–23)
CALCIUM SERPL-MCNC: 9.2 MG/DL — SIGNIFICANT CHANGE UP (ref 8.4–10.5)
CHLORIDE SERPL-SCNC: 103 MMOL/L — SIGNIFICANT CHANGE UP (ref 98–107)
CO2 SERPL-SCNC: 17 MMOL/L — LOW (ref 22–31)
CREAT SERPL-MCNC: 0.49 MG/DL — LOW (ref 0.5–1.3)
EOSINOPHIL # BLD AUTO: 0.27 K/UL — SIGNIFICANT CHANGE UP (ref 0–0.5)
EOSINOPHIL NFR BLD AUTO: 3.4 % — SIGNIFICANT CHANGE UP (ref 0–6)
FIBRINOGEN PPP-MCNC: 736 MG/DL — HIGH (ref 350–510)
GLUCOSE BLDC GLUCOMTR-MCNC: 112 MG/DL — HIGH (ref 70–99)
GLUCOSE BLDC GLUCOMTR-MCNC: 83 MG/DL — SIGNIFICANT CHANGE UP (ref 70–99)
GLUCOSE SERPL-MCNC: 76 MG/DL — SIGNIFICANT CHANGE UP (ref 70–99)
HCT VFR BLD CALC: 29.3 % — LOW (ref 34.5–45)
HGB BLD-MCNC: 9.9 G/DL — LOW (ref 11.5–15.5)
IMM GRANULOCYTES NFR BLD AUTO: 0.4 % — SIGNIFICANT CHANGE UP (ref 0–1.5)
INR BLD: 0.99 — SIGNIFICANT CHANGE UP (ref 0.88–1.17)
LDH SERPL L TO P-CCNC: 113 U/L — LOW (ref 135–225)
LYMPHOCYTES # BLD AUTO: 2.9 K/UL — SIGNIFICANT CHANGE UP (ref 1–3.3)
LYMPHOCYTES # BLD AUTO: 36.3 % — SIGNIFICANT CHANGE UP (ref 13–44)
M PROTEIN 24H MFR UR ELPH: 468 MG/24 HR — SIGNIFICANT CHANGE UP
MCHC RBC-ENTMCNC: 27.7 PG — SIGNIFICANT CHANGE UP (ref 27–34)
MCHC RBC-ENTMCNC: 33.8 % — SIGNIFICANT CHANGE UP (ref 32–36)
MCV RBC AUTO: 81.8 FL — SIGNIFICANT CHANGE UP (ref 80–100)
MONOCYTES # BLD AUTO: 0.49 K/UL — SIGNIFICANT CHANGE UP (ref 0–0.9)
MONOCYTES NFR BLD AUTO: 6.1 % — SIGNIFICANT CHANGE UP (ref 2–14)
NEUTROPHILS # BLD AUTO: 4.28 K/UL — SIGNIFICANT CHANGE UP (ref 1.8–7.4)
NEUTROPHILS NFR BLD AUTO: 53.4 % — SIGNIFICANT CHANGE UP (ref 43–77)
NRBC # FLD: 0 K/UL — SIGNIFICANT CHANGE UP (ref 0–0)
PLATELET # BLD AUTO: 299 K/UL — SIGNIFICANT CHANGE UP (ref 150–400)
PMV BLD: 9.6 FL — SIGNIFICANT CHANGE UP (ref 7–13)
POTASSIUM SERPL-MCNC: 3.4 MMOL/L — LOW (ref 3.5–5.3)
POTASSIUM SERPL-SCNC: 3.4 MMOL/L — LOW (ref 3.5–5.3)
PROT SERPL-MCNC: 6.4 G/DL — SIGNIFICANT CHANGE UP (ref 6–8.3)
PROTHROM AB SERPL-ACNC: 11.3 SEC — SIGNIFICANT CHANGE UP (ref 9.8–13.1)
RBC # BLD: 3.58 M/UL — LOW (ref 3.8–5.2)
RBC # FLD: 13.4 % — SIGNIFICANT CHANGE UP (ref 10.3–14.5)
SODIUM SERPL-SCNC: 132 MMOL/L — LOW (ref 135–145)
SPECIMEN VOL 24H UR: 2125 ML — SIGNIFICANT CHANGE UP
URATE SERPL-MCNC: 4.3 MG/DL — SIGNIFICANT CHANGE UP (ref 2.5–7)
WBC # BLD: 8 K/UL — SIGNIFICANT CHANGE UP (ref 3.8–10.5)
WBC # FLD AUTO: 8 K/UL — SIGNIFICANT CHANGE UP (ref 3.8–10.5)

## 2020-02-12 RX ORDER — FAMOTIDINE 10 MG/ML
1 INJECTION INTRAVENOUS
Qty: 0 | Refills: 0 | DISCHARGE
Start: 2020-02-12

## 2020-02-12 RX ADMIN — FAMOTIDINE 20 MILLIGRAM(S): 10 INJECTION INTRAVENOUS at 08:07

## 2020-02-12 RX ADMIN — METFORMIN HYDROCHLORIDE 850 MILLIGRAM(S): 850 TABLET ORAL at 08:06

## 2020-02-12 RX ADMIN — Medication 14 UNIT(S): at 08:06

## 2020-02-12 NOTE — PROGRESS NOTE ADULT - SUBJECTIVE AND OBJECTIVE BOX
R3 Antepartum Note  HD#3     Patient seen and examined at bedside, no acute overnight events. No acute complaints.     Pt reports +FM. Denies LOF, VB, ctx, HA, epigastric pain, blurred vision, CP, SOB, N/V, fevers, and chills.    Vital Signs Last 24 Hours  T(C): 36.6 (02-12-20 @ 05:44), Max: 36.9 (02-11-20 @ 17:21)  HR: 86 (02-12-20 @ 05:44) (84 - 102)  BP: 112/63 (02-12-20 @ 05:44) (98/63 - 122/77)  RR: 16 (02-12-20 @ 05:44) (16 - 18)  SpO2: 98% (02-12-20 @ 05:44) (97% - 98%)    CAPILLARY BLOOD GLUCOSE    POCT Blood Glucose.: 83 mg/dL (12 Feb 2020 07:46)  POCT Blood Glucose.: 110 mg/dL (11 Feb 2020 21:57)  POCT Blood Glucose.: 115 mg/dL (11 Feb 2020 19:25)  POCT Blood Glucose.: 122 mg/dL (11 Feb 2020 17:17)  POCT Blood Glucose.: 98 mg/dL (11 Feb 2020 12:05)  POCT Blood Glucose.: 154 mg/dL (11 Feb 2020 09:50)      Physical Exam:  General: NAD  Abdomen: Soft, non-tender, gravid  Ext: No pain or swelling    Labs:             9.9    8.00  )-----------( 299      ( 02-12 @ 05:18 )             29.3     02-12 @ 05:18    132  |  103  |  12  ----------------------------<  76  3.4   |  17  |  0.49    Ca    9.2      02-12 @ 05:18    TPro  6.4  /  Alb  3.1  /  TBili  < 0.2  /  DBili  x   /  AST  11  /  ALT  10  /  AlkPhos  74  02-12 @ 05:18    PT/INR - ( 02-12 @ 05:18 )   PT: 11.3 SEC;   INR: 0.99     PTT - ( 02-12 @ 05:18 )  PTT:26.5 SEC    Uric Acid: (02-12 @ 05:18)  4.3      Fibrinogen: (02-12 @ 05:18)  736.0    LDH: (02-12 @ 05:18)  113        MEDICATIONS  (STANDING):  dextrose 5%. 1000 milliLiter(s) (50 mL/Hr) IV Continuous <Continuous>  dextrose 50% Injectable 12.5 Gram(s) IV Push once  dextrose 50% Injectable 25 Gram(s) IV Push once  dextrose 50% Injectable 25 Gram(s) IV Push once  famotidine    Tablet 20 milliGRAM(s) Oral two times a day  insulin lispro Injectable (HumaLOG) 14 Unit(s) SubCutaneous three times a day before meals  insulin NPH human recombinant 40 Unit(s) SubCutaneous at bedtime  metFORMIN 850 milliGRAM(s) Oral two times a day  prenatal multivitamin 1 Tablet(s) Oral daily    MEDICATIONS  (PRN):  dextrose 40% Gel 15 Gram(s) Oral once PRN Blood Glucose LESS THAN 70 milliGRAM(s)/deciliter  glucagon  Injectable 1 milliGRAM(s) IntraMuscular once PRN Glucose LESS THAN 70 milligrams/deciliter

## 2020-02-12 NOTE — PROGRESS NOTE ADULT - PROBLEM SELECTOR PLAN 1
Neuro: No analgesics required   CV: Hemodynamically stable. Continue to monitor BPs. AM HELLP labs pending. 24 hour urine protein collection 468. Has not had any further BPs above 140s/90s, does not meet criteria for PEC at this time.   Pulm: O2 sat WNL on RA  GI: Tolerating regular ADA diet   : Voiding spontaneously  Endo: Continue on home metformin 850mg BID, NPH 40u qHS and humalog 14/14/14 with meals. Continue to monitor FS.   Heme: SCDs while in bed  ID: Afebrile, No signs of infection  Dispo: AM HELLP labs pending, continue to monitor. Discharge planning.     CAREY Hicks PGY3

## 2020-02-12 NOTE — PROGRESS NOTE ADULT - ASSESSMENT
40yo  at 36w1d presenting from Dr. Khalil office with elevated BPs, systolic at 150s, and elevated fasting FS, known T2DM, in stable condition.

## 2020-02-12 NOTE — PROGRESS NOTE ADULT - SUBJECTIVE AND OBJECTIVE BOX
OB attending Note; HD #2  S: no c/o VSS. Bps normal range  Abd: Gravid  Pelvic: deferred  exts: no CCE    FS: Fastin-87       1 hour PP: 101-122  24 hour urine: 469 mg protein  HEELP labs; WNL    A: IUP at 36 weeks with preGDM on insulin and metformin and GHTN asymptomatic and normotensive in hospital  P: discharge home      F/U in 2-3 days in 's office      induction at 38 weeks

## 2020-02-13 ENCOUNTER — APPOINTMENT (OUTPATIENT)
Dept: ANTEPARTUM | Facility: CLINIC | Age: 40
End: 2020-02-13

## 2020-02-13 ENCOUNTER — APPOINTMENT (OUTPATIENT)
Dept: ANTEPARTUM | Facility: HOSPITAL | Age: 40
End: 2020-02-13

## 2020-02-19 ENCOUNTER — INPATIENT (INPATIENT)
Facility: HOSPITAL | Age: 40
LOS: 5 days | Discharge: HOME | End: 2020-02-25
Attending: OBSTETRICS & GYNECOLOGY | Admitting: OBSTETRICS & GYNECOLOGY
Payer: COMMERCIAL

## 2020-02-19 VITALS
DIASTOLIC BLOOD PRESSURE: 67 MMHG | SYSTOLIC BLOOD PRESSURE: 107 MMHG | HEART RATE: 101 BPM | OXYGEN SATURATION: 96 % | RESPIRATION RATE: 18 BRPM | TEMPERATURE: 98 F

## 2020-02-19 DIAGNOSIS — Z90.49 ACQUIRED ABSENCE OF OTHER SPECIFIED PARTS OF DIGESTIVE TRACT: Chronic | ICD-10-CM

## 2020-02-19 DIAGNOSIS — Z98.82 BREAST IMPLANT STATUS: Chronic | ICD-10-CM

## 2020-02-19 DIAGNOSIS — O03.9 COMPLETE OR UNSPECIFIED SPONTANEOUS ABORTION WITHOUT COMPLICATION: Chronic | ICD-10-CM

## 2020-02-19 DIAGNOSIS — Z98.890 OTHER SPECIFIED POSTPROCEDURAL STATES: Chronic | ICD-10-CM

## 2020-02-19 DIAGNOSIS — O14.00 MILD TO MODERATE PRE-ECLAMPSIA, UNSPECIFIED TRIMESTER: ICD-10-CM

## 2020-02-19 RX ORDER — SODIUM CHLORIDE 9 MG/ML
1000 INJECTION, SOLUTION INTRAVENOUS
Refills: 0 | Status: DISCONTINUED | OUTPATIENT
Start: 2020-02-19 | End: 2020-02-20

## 2020-02-19 RX ORDER — CITRIC ACID/SODIUM CITRATE 300-500 MG
15 SOLUTION, ORAL ORAL EVERY 6 HOURS
Refills: 0 | Status: DISCONTINUED | OUTPATIENT
Start: 2020-02-19 | End: 2020-02-22

## 2020-02-19 RX ORDER — OXYTOCIN 10 UNIT/ML
333.33 VIAL (ML) INJECTION
Qty: 20 | Refills: 0 | Status: DISCONTINUED | OUTPATIENT
Start: 2020-02-19 | End: 2020-02-22

## 2020-02-20 ENCOUNTER — APPOINTMENT (OUTPATIENT)
Dept: MATERNAL FETAL MEDICINE | Facility: CLINIC | Age: 40
End: 2020-02-20

## 2020-02-20 ENCOUNTER — APPOINTMENT (OUTPATIENT)
Dept: ANTEPARTUM | Facility: CLINIC | Age: 40
End: 2020-02-20

## 2020-02-20 ENCOUNTER — APPOINTMENT (OUTPATIENT)
Dept: ANTEPARTUM | Facility: HOSPITAL | Age: 40
End: 2020-02-20

## 2020-02-20 DIAGNOSIS — Z3A.37 37 WEEKS GESTATION OF PREGNANCY: ICD-10-CM

## 2020-02-20 LAB
ALBUMIN SERPL ELPH-MCNC: 3.4 G/DL — SIGNIFICANT CHANGE UP (ref 3.3–5)
ALP SERPL-CCNC: 91 U/L — SIGNIFICANT CHANGE UP (ref 40–120)
ALT FLD-CCNC: 11 U/L — SIGNIFICANT CHANGE UP (ref 4–33)
ANION GAP SERPL CALC-SCNC: 12 MMO/L — SIGNIFICANT CHANGE UP (ref 7–14)
APPEARANCE UR: CLEAR — SIGNIFICANT CHANGE UP
APTT BLD: 25.4 SEC — LOW (ref 27.5–36.3)
AST SERPL-CCNC: 12 U/L — SIGNIFICANT CHANGE UP (ref 4–32)
BASOPHILS # BLD AUTO: 0.03 K/UL — SIGNIFICANT CHANGE UP (ref 0–0.2)
BASOPHILS NFR BLD AUTO: 0.3 % — SIGNIFICANT CHANGE UP (ref 0–2)
BILIRUB SERPL-MCNC: < 0.2 MG/DL — LOW (ref 0.2–1.2)
BILIRUB UR-MCNC: NEGATIVE — SIGNIFICANT CHANGE UP
BLD GP AB SCN SERPL QL: NEGATIVE — SIGNIFICANT CHANGE UP
BLOOD UR QL VISUAL: NEGATIVE — SIGNIFICANT CHANGE UP
BUN SERPL-MCNC: 17 MG/DL — SIGNIFICANT CHANGE UP (ref 7–23)
CALCIUM SERPL-MCNC: 9.5 MG/DL — SIGNIFICANT CHANGE UP (ref 8.4–10.5)
CHLORIDE SERPL-SCNC: 100 MMOL/L — SIGNIFICANT CHANGE UP (ref 98–107)
CO2 SERPL-SCNC: 19 MMOL/L — LOW (ref 22–31)
COLOR SPEC: YELLOW — SIGNIFICANT CHANGE UP
CREAT ?TM UR-MCNC: 136.5 MG/DL — SIGNIFICANT CHANGE UP
CREAT SERPL-MCNC: 0.63 MG/DL — SIGNIFICANT CHANGE UP (ref 0.5–1.3)
EOSINOPHIL # BLD AUTO: 0.21 K/UL — SIGNIFICANT CHANGE UP (ref 0–0.5)
EOSINOPHIL NFR BLD AUTO: 2.4 % — SIGNIFICANT CHANGE UP (ref 0–6)
FIBRINOGEN PPP-MCNC: 623.9 MG/DL — HIGH (ref 350–510)
GLUCOSE BLDC GLUCOMTR-MCNC: 103 MG/DL — HIGH (ref 70–99)
GLUCOSE BLDC GLUCOMTR-MCNC: 109 MG/DL — HIGH (ref 70–99)
GLUCOSE BLDC GLUCOMTR-MCNC: 109 MG/DL — HIGH (ref 70–99)
GLUCOSE BLDC GLUCOMTR-MCNC: 110 MG/DL — HIGH (ref 70–99)
GLUCOSE BLDC GLUCOMTR-MCNC: 110 MG/DL — HIGH (ref 70–99)
GLUCOSE BLDC GLUCOMTR-MCNC: 112 MG/DL — HIGH (ref 70–99)
GLUCOSE BLDC GLUCOMTR-MCNC: 116 MG/DL — HIGH (ref 70–99)
GLUCOSE BLDC GLUCOMTR-MCNC: 129 MG/DL — HIGH (ref 70–99)
GLUCOSE BLDC GLUCOMTR-MCNC: 133 MG/DL — HIGH (ref 70–99)
GLUCOSE BLDC GLUCOMTR-MCNC: 135 MG/DL — HIGH (ref 70–99)
GLUCOSE BLDC GLUCOMTR-MCNC: 135 MG/DL — HIGH (ref 70–99)
GLUCOSE BLDC GLUCOMTR-MCNC: 138 MG/DL — HIGH (ref 70–99)
GLUCOSE BLDC GLUCOMTR-MCNC: 143 MG/DL — HIGH (ref 70–99)
GLUCOSE BLDC GLUCOMTR-MCNC: 144 MG/DL — HIGH (ref 70–99)
GLUCOSE BLDC GLUCOMTR-MCNC: 147 MG/DL — HIGH (ref 70–99)
GLUCOSE BLDC GLUCOMTR-MCNC: 148 MG/DL — HIGH (ref 70–99)
GLUCOSE BLDC GLUCOMTR-MCNC: 150 MG/DL — HIGH (ref 70–99)
GLUCOSE BLDC GLUCOMTR-MCNC: 153 MG/DL — HIGH (ref 70–99)
GLUCOSE BLDC GLUCOMTR-MCNC: 154 MG/DL — HIGH (ref 70–99)
GLUCOSE BLDC GLUCOMTR-MCNC: 96 MG/DL — SIGNIFICANT CHANGE UP (ref 70–99)
GLUCOSE SERPL-MCNC: 126 MG/DL — HIGH (ref 70–99)
GLUCOSE UR-MCNC: NEGATIVE — SIGNIFICANT CHANGE UP
HCT VFR BLD CALC: 30.4 % — LOW (ref 34.5–45)
HGB BLD-MCNC: 10.5 G/DL — LOW (ref 11.5–15.5)
IMM GRANULOCYTES NFR BLD AUTO: 0.3 % — SIGNIFICANT CHANGE UP (ref 0–1.5)
INR BLD: 0.99 — SIGNIFICANT CHANGE UP (ref 0.88–1.17)
KETONES UR-MCNC: SIGNIFICANT CHANGE UP
LDH SERPL L TO P-CCNC: 137 U/L — SIGNIFICANT CHANGE UP (ref 135–225)
LEUKOCYTE ESTERASE UR-ACNC: NEGATIVE — SIGNIFICANT CHANGE UP
LYMPHOCYTES # BLD AUTO: 2.92 K/UL — SIGNIFICANT CHANGE UP (ref 1–3.3)
LYMPHOCYTES # BLD AUTO: 33.9 % — SIGNIFICANT CHANGE UP (ref 13–44)
MCHC RBC-ENTMCNC: 28.4 PG — SIGNIFICANT CHANGE UP (ref 27–34)
MCHC RBC-ENTMCNC: 34.5 % — SIGNIFICANT CHANGE UP (ref 32–36)
MCV RBC AUTO: 82.2 FL — SIGNIFICANT CHANGE UP (ref 80–100)
MONOCYTES # BLD AUTO: 0.48 K/UL — SIGNIFICANT CHANGE UP (ref 0–0.9)
MONOCYTES NFR BLD AUTO: 5.6 % — SIGNIFICANT CHANGE UP (ref 2–14)
NEUTROPHILS # BLD AUTO: 4.95 K/UL — SIGNIFICANT CHANGE UP (ref 1.8–7.4)
NEUTROPHILS NFR BLD AUTO: 57.5 % — SIGNIFICANT CHANGE UP (ref 43–77)
NITRITE UR-MCNC: NEGATIVE — SIGNIFICANT CHANGE UP
NRBC # FLD: 0 K/UL — SIGNIFICANT CHANGE UP (ref 0–0)
PH UR: 6 — SIGNIFICANT CHANGE UP (ref 5–8)
PLATELET # BLD AUTO: 304 K/UL — SIGNIFICANT CHANGE UP (ref 150–400)
PMV BLD: 9.9 FL — SIGNIFICANT CHANGE UP (ref 7–13)
POTASSIUM SERPL-MCNC: 3.5 MMOL/L — SIGNIFICANT CHANGE UP (ref 3.5–5.3)
POTASSIUM SERPL-SCNC: 3.5 MMOL/L — SIGNIFICANT CHANGE UP (ref 3.5–5.3)
PROT SERPL-MCNC: 6.7 G/DL — SIGNIFICANT CHANGE UP (ref 6–8.3)
PROT UR-MCNC: 17.9 MG/DL — SIGNIFICANT CHANGE UP
PROT UR-MCNC: 20 — SIGNIFICANT CHANGE UP
PROTHROM AB SERPL-ACNC: 11 SEC — SIGNIFICANT CHANGE UP (ref 9.8–13.1)
RBC # BLD: 3.7 M/UL — LOW (ref 3.8–5.2)
RBC # FLD: 13.5 % — SIGNIFICANT CHANGE UP (ref 10.3–14.5)
RH IG SCN BLD-IMP: POSITIVE — SIGNIFICANT CHANGE UP
SODIUM SERPL-SCNC: 131 MMOL/L — LOW (ref 135–145)
SP GR SPEC: 1.03 — SIGNIFICANT CHANGE UP (ref 1–1.04)
T PALLIDUM AB TITR SER: NEGATIVE — SIGNIFICANT CHANGE UP
URATE SERPL-MCNC: 4.4 MG/DL — SIGNIFICANT CHANGE UP (ref 2.5–7)
UROBILINOGEN FLD QL: NORMAL — SIGNIFICANT CHANGE UP
WBC # BLD: 8.62 K/UL — SIGNIFICANT CHANGE UP (ref 3.8–10.5)
WBC # FLD AUTO: 8.62 K/UL — SIGNIFICANT CHANGE UP (ref 3.8–10.5)

## 2020-02-20 RX ORDER — ACETAMINOPHEN 500 MG
975 TABLET ORAL ONCE
Refills: 0 | Status: COMPLETED | OUTPATIENT
Start: 2020-02-20 | End: 2020-02-20

## 2020-02-20 RX ORDER — SODIUM CHLORIDE 9 MG/ML
1000 INJECTION, SOLUTION INTRAVENOUS
Refills: 0 | Status: DISCONTINUED | OUTPATIENT
Start: 2020-02-20 | End: 2020-02-22

## 2020-02-20 RX ORDER — METOCLOPRAMIDE HCL 10 MG
10 TABLET ORAL ONCE
Refills: 0 | Status: COMPLETED | OUTPATIENT
Start: 2020-02-20 | End: 2020-02-20

## 2020-02-20 RX ORDER — SODIUM CHLORIDE 9 MG/ML
1000 INJECTION, SOLUTION INTRAVENOUS
Refills: 0 | Status: DISCONTINUED | OUTPATIENT
Start: 2020-02-20 | End: 2020-02-20

## 2020-02-20 RX ORDER — INSULIN HUMAN 100 [IU]/ML
1 INJECTION, SOLUTION SUBCUTANEOUS
Qty: 100 | Refills: 0 | Status: DISCONTINUED | OUTPATIENT
Start: 2020-02-20 | End: 2020-02-20

## 2020-02-20 RX ORDER — SODIUM CHLORIDE 9 MG/ML
1000 INJECTION INTRAMUSCULAR; INTRAVENOUS; SUBCUTANEOUS
Refills: 0 | Status: DISCONTINUED | OUTPATIENT
Start: 2020-02-20 | End: 2020-02-22

## 2020-02-20 RX ORDER — INSULIN HUMAN 100 [IU]/ML
1.5 INJECTION, SOLUTION SUBCUTANEOUS
Qty: 100 | Refills: 0 | Status: DISCONTINUED | OUTPATIENT
Start: 2020-02-20 | End: 2020-02-21

## 2020-02-20 RX ADMIN — Medication 10 MILLIGRAM(S): at 22:17

## 2020-02-20 RX ADMIN — SODIUM CHLORIDE 30 MILLILITER(S): 9 INJECTION INTRAMUSCULAR; INTRAVENOUS; SUBCUTANEOUS at 08:59

## 2020-02-20 RX ADMIN — INSULIN HUMAN 1 UNIT(S)/HR: 100 INJECTION, SOLUTION SUBCUTANEOUS at 06:54

## 2020-02-20 RX ADMIN — Medication 975 MILLIGRAM(S): at 19:15

## 2020-02-20 RX ADMIN — Medication 975 MILLIGRAM(S): at 18:50

## 2020-02-20 RX ADMIN — SODIUM CHLORIDE 125 MILLILITER(S): 9 INJECTION, SOLUTION INTRAVENOUS at 08:59

## 2020-02-20 NOTE — OB PROVIDER H&P - ASSESSMENT
39 year old  at 37.1 weeks who presents for induction of labor for DM-2 and PEC. 39 year old  at 37.2 weeks who presents for induction of labor for DM-2 and PEC.

## 2020-02-20 NOTE — PROGRESS NOTE ADULT - SUBJECTIVE AND OBJECTIVE BOX
Attending Note  Patient seen and evaluated first time around 145pm and noted category 1 tracing and all findings and plan of management discussed     now on evaluation noted cervix ext os FT and failed voss   induction for Mild pec and GDMa2 occ poor control     Vital Signs Last 24 Hrs  T(C): 36.3 (20 Feb 2020 15:30), Max: 36.7 (20 Feb 2020 10:55)  T(F): 97.34 (20 Feb 2020 15:30), Max: 98.06 (20 Feb 2020 10:55)  HR: 72 (20 Feb 2020 19:25) (72 - 101)  BP: 114/65 (20 Feb 2020 19:25) (102/60 - 117/76)  BP(mean): --  RR: 18 (20 Feb 2020 01:49) (18 - 18)  SpO2: 98% (20 Feb 2020 13:13) (96% - 100%)    FETAL HEART RATE: category 1     Excel:    CERVICAL EXAM: tight FT cervix deviated slightly to the left     PAIN SCALE (0-10):2-3 /10       Assessment/ plan   Attempted cervical balloon x 2 in bed and in stirrups -lithothomy with speculum and both failed as internal os is closed  continue Cytotec and reassess for voss placement later on     C Remi

## 2020-02-20 NOTE — OB PROVIDER H&P - PROBLEM SELECTOR PLAN 1
-Admit to L&D  -HELLP labs  -IV fluids; fingersticks q 6 hours  -GBS negative  -Moore score of 4; start PO cytotec   -Continuos fetal monitoring    d/w Dr. Remi Arroyo PGY1

## 2020-02-20 NOTE — CHART NOTE - NSCHARTNOTEFT_GEN_A_CORE
R4 OB Tracing Note    T(C): 36.3 (20 @ 15:30), Max: 36.7 (20 @ 10:55)  HR: 87 (20 @ 15:31) (78 - 101)  BP: 102/60 (20 @ 15:31) (102/60 - 117/76)  RR: 18 (20 @ 01:49) (18 - 18)  SpO2: 98% (20 @ 13:13) (96% - 100%)    CAPILLARY BLOOD GLUCOSE      POCT Blood Glucose.: 96 mg/dL (2020 18:43)  POCT Blood Glucose.: 109 mg/dL (2020 17:36)  POCT Blood Glucose.: 110 mg/dL (2020 16:34)  POCT Blood Glucose.: 129 mg/dL (2020 15:27)  POCT Blood Glucose.: 153 mg/dL (2020 14:26)  POCT Blood Glucose.: 148 mg/dL (2020 13:12)  POCT Blood Glucose.: 135 mg/dL (2020 12:00)  POCT Blood Glucose.: 143 mg/dL (2020 10:59)  POCT Blood Glucose.: 147 mg/dL (2020 09:55)  POCT Blood Glucose.: 150 mg/dL (2020 08:58)  POCT Blood Glucose.: 154 mg/dL (2020 07:56)  POCT Blood Glucose.: 138 mg/dL (2020 06:51)  POCT Blood Glucose.: 135 mg/dL (2020 04:40)  POCT Blood Glucose.: 144 mg/dL (2020 04:10)  POCT Blood Glucose.: 133 mg/dL (2020 00:19)      SVE deferred    FHT bl 140, mod variability, accels, no decels  TOCO irreg ctx    AP: 39y y/o  undergoing induction for preeclampsia with well controlled BP and cat I tracing.  Patient with DMII on insulin drip with improved glycemic control.  -continue induction with oral cytotec  -continue to monitor BPs and for symptoms of preeclampsia  -cw  insulin drip Y Tubing, FS monitoring    J Seun PGY4

## 2020-02-20 NOTE — OB PROVIDER H&P - HISTORY OF PRESENT ILLNESS
Patient is a 39 year old  at 37.1 weeks who presents for induction of labor for DM-2 and PEC. Patient has followed with Dr. Khalil this pregnancy and has had other complications. She reports no contractions, no vaginal bleeding or leakage of fluid. She feels good fetal movement.     GBS: negative  EFW: 3400g  Sono: vertex    Obhx: SAB ; MAB w D&C    PMHx: DM-2, PEC  Meds: Humulin 48 units qhs; Humalog 18u q meals; metformin 850 bid   SocHx: denies x 3  SurgHx: breast augmentation (), abdominoplasty ()  All: NKDA Patient is a 39 year old  at 37.1 weeks who presents for induction of labor for DM-2 and PEC. Patient has followed with Dr. Khalil this pregnancy and has had other complications. She reports no contractions, no vaginal bleeding or leakage of fluid. She feels good fetal movement.     GBS: negative  EFW: 3400g  Sono: vertex    Obhx: SAB ; MAB w D&C    PMHx: DM-2, PEC  Meds: Humulin 48 units qhs; Humalog 18u q meals; metformin 850 bid   SocHx: denies x 3  SurgHx: breast augmentation (), abdominoplasty (), lap cholecystectomy ()   All: NKDA

## 2020-02-20 NOTE — OB PROVIDER H&P - NSHPPHYSICALEXAM_GEN_ALL_CORE
VS  T(C): 36.6 (02-19-20 @ 23:30)  HR: 101 (02-19-20 @ 23:30)  BP: 107/67 (02-19-20 @ 23:44)  RR: 18 (02-19-20 @ 23:44)  SpO2: 96% (02-19-20 @ 23:30)    Gen: NAD, lying   CV: Clear S1/S2  Resp: lungs CTA  Abd: soft, nontender gravid uterus; Pfannenstiel abdominoplasty incision well healed     SVE: 0.5/60/-3  FHR: baseline indeterminate / mod summer / +acel / -decel: cat 1   [place]: no ctx

## 2020-02-21 ENCOUNTER — TRANSCRIPTION ENCOUNTER (OUTPATIENT)
Age: 40
End: 2020-02-21

## 2020-02-21 LAB
ALBUMIN SERPL ELPH-MCNC: 2.8 G/DL — LOW (ref 3.3–5)
ALBUMIN SERPL ELPH-MCNC: 3.3 G/DL — SIGNIFICANT CHANGE UP (ref 3.3–5)
ALP SERPL-CCNC: 111 U/L — SIGNIFICANT CHANGE UP (ref 40–120)
ALP SERPL-CCNC: 86 U/L — SIGNIFICANT CHANGE UP (ref 40–120)
ALT FLD-CCNC: 11 U/L — SIGNIFICANT CHANGE UP (ref 4–33)
ALT FLD-CCNC: 12 U/L — SIGNIFICANT CHANGE UP (ref 4–33)
ANION GAP SERPL CALC-SCNC: 12 MMO/L — SIGNIFICANT CHANGE UP (ref 7–14)
ANION GAP SERPL CALC-SCNC: 12 MMO/L — SIGNIFICANT CHANGE UP (ref 7–14)
APTT BLD: 24.1 SEC — LOW (ref 27.5–36.3)
APTT BLD: 27.1 SEC — LOW (ref 27.5–36.3)
AST SERPL-CCNC: 12 U/L — SIGNIFICANT CHANGE UP (ref 4–32)
AST SERPL-CCNC: 16 U/L — SIGNIFICANT CHANGE UP (ref 4–32)
BASOPHILS # BLD AUTO: 0.02 K/UL — SIGNIFICANT CHANGE UP (ref 0–0.2)
BASOPHILS # BLD AUTO: 0.02 K/UL — SIGNIFICANT CHANGE UP (ref 0–0.2)
BASOPHILS NFR BLD AUTO: 0.2 % — SIGNIFICANT CHANGE UP (ref 0–2)
BASOPHILS NFR BLD AUTO: 0.2 % — SIGNIFICANT CHANGE UP (ref 0–2)
BILIRUB SERPL-MCNC: 0.2 MG/DL — SIGNIFICANT CHANGE UP (ref 0.2–1.2)
BILIRUB SERPL-MCNC: 0.3 MG/DL — SIGNIFICANT CHANGE UP (ref 0.2–1.2)
BUN SERPL-MCNC: 6 MG/DL — LOW (ref 7–23)
BUN SERPL-MCNC: 9 MG/DL — SIGNIFICANT CHANGE UP (ref 7–23)
CALCIUM SERPL-MCNC: 8 MG/DL — LOW (ref 8.4–10.5)
CALCIUM SERPL-MCNC: 9.1 MG/DL — SIGNIFICANT CHANGE UP (ref 8.4–10.5)
CHLORIDE SERPL-SCNC: 107 MMOL/L — SIGNIFICANT CHANGE UP (ref 98–107)
CHLORIDE SERPL-SCNC: 108 MMOL/L — HIGH (ref 98–107)
CO2 SERPL-SCNC: 16 MMOL/L — LOW (ref 22–31)
CO2 SERPL-SCNC: 19 MMOL/L — LOW (ref 22–31)
CREAT SERPL-MCNC: 0.45 MG/DL — LOW (ref 0.5–1.3)
CREAT SERPL-MCNC: 0.66 MG/DL — SIGNIFICANT CHANGE UP (ref 0.5–1.3)
EOSINOPHIL # BLD AUTO: 0.04 K/UL — SIGNIFICANT CHANGE UP (ref 0–0.5)
EOSINOPHIL # BLD AUTO: 0.22 K/UL — SIGNIFICANT CHANGE UP (ref 0–0.5)
EOSINOPHIL NFR BLD AUTO: 0.4 % — SIGNIFICANT CHANGE UP (ref 0–6)
EOSINOPHIL NFR BLD AUTO: 2.7 % — SIGNIFICANT CHANGE UP (ref 0–6)
FIBRINOGEN PPP-MCNC: 711 MG/DL — HIGH (ref 350–510)
FIBRINOGEN PPP-MCNC: 847.2 MG/DL — HIGH (ref 350–510)
GLUCOSE BLDC GLUCOMTR-MCNC: 100 MG/DL — HIGH (ref 70–99)
GLUCOSE BLDC GLUCOMTR-MCNC: 100 MG/DL — HIGH (ref 70–99)
GLUCOSE BLDC GLUCOMTR-MCNC: 101 MG/DL — HIGH (ref 70–99)
GLUCOSE BLDC GLUCOMTR-MCNC: 102 MG/DL — HIGH (ref 70–99)
GLUCOSE BLDC GLUCOMTR-MCNC: 103 MG/DL — HIGH (ref 70–99)
GLUCOSE BLDC GLUCOMTR-MCNC: 105 MG/DL — HIGH (ref 70–99)
GLUCOSE BLDC GLUCOMTR-MCNC: 108 MG/DL — HIGH (ref 70–99)
GLUCOSE BLDC GLUCOMTR-MCNC: 109 MG/DL — HIGH (ref 70–99)
GLUCOSE BLDC GLUCOMTR-MCNC: 109 MG/DL — HIGH (ref 70–99)
GLUCOSE BLDC GLUCOMTR-MCNC: 112 MG/DL — HIGH (ref 70–99)
GLUCOSE BLDC GLUCOMTR-MCNC: 113 MG/DL — HIGH (ref 70–99)
GLUCOSE BLDC GLUCOMTR-MCNC: 119 MG/DL — HIGH (ref 70–99)
GLUCOSE BLDC GLUCOMTR-MCNC: 120 MG/DL — HIGH (ref 70–99)
GLUCOSE BLDC GLUCOMTR-MCNC: 120 MG/DL — HIGH (ref 70–99)
GLUCOSE BLDC GLUCOMTR-MCNC: 121 MG/DL — HIGH (ref 70–99)
GLUCOSE BLDC GLUCOMTR-MCNC: 124 MG/DL — HIGH (ref 70–99)
GLUCOSE BLDC GLUCOMTR-MCNC: 132 MG/DL — HIGH (ref 70–99)
GLUCOSE BLDC GLUCOMTR-MCNC: 133 MG/DL — HIGH (ref 70–99)
GLUCOSE BLDC GLUCOMTR-MCNC: 133 MG/DL — HIGH (ref 70–99)
GLUCOSE BLDC GLUCOMTR-MCNC: 135 MG/DL — HIGH (ref 70–99)
GLUCOSE BLDC GLUCOMTR-MCNC: 97 MG/DL — SIGNIFICANT CHANGE UP (ref 70–99)
GLUCOSE BLDC GLUCOMTR-MCNC: 99 MG/DL — SIGNIFICANT CHANGE UP (ref 70–99)
GLUCOSE BLDC GLUCOMTR-MCNC: 99 MG/DL — SIGNIFICANT CHANGE UP (ref 70–99)
GLUCOSE SERPL-MCNC: 109 MG/DL — HIGH (ref 70–99)
GLUCOSE SERPL-MCNC: 114 MG/DL — HIGH (ref 70–99)
HCT VFR BLD CALC: 28.6 % — LOW (ref 34.5–45)
HCT VFR BLD CALC: 34.9 % — SIGNIFICANT CHANGE UP (ref 34.5–45)
HGB BLD-MCNC: 11.1 G/DL — LOW (ref 11.5–15.5)
HGB BLD-MCNC: 9.9 G/DL — LOW (ref 11.5–15.5)
IMM GRANULOCYTES NFR BLD AUTO: 0.4 % — SIGNIFICANT CHANGE UP (ref 0–1.5)
IMM GRANULOCYTES NFR BLD AUTO: 0.5 % — SIGNIFICANT CHANGE UP (ref 0–1.5)
INR BLD: 1.03 — SIGNIFICANT CHANGE UP (ref 0.88–1.17)
INR BLD: 1.05 — SIGNIFICANT CHANGE UP (ref 0.88–1.17)
LDH SERPL L TO P-CCNC: 119 U/L — LOW (ref 135–225)
LDH SERPL L TO P-CCNC: 154 U/L — SIGNIFICANT CHANGE UP (ref 135–225)
LYMPHOCYTES # BLD AUTO: 1.57 K/UL — SIGNIFICANT CHANGE UP (ref 1–3.3)
LYMPHOCYTES # BLD AUTO: 13.8 % — SIGNIFICANT CHANGE UP (ref 13–44)
LYMPHOCYTES # BLD AUTO: 2.81 K/UL — SIGNIFICANT CHANGE UP (ref 1–3.3)
LYMPHOCYTES # BLD AUTO: 34.3 % — SIGNIFICANT CHANGE UP (ref 13–44)
MCHC RBC-ENTMCNC: 27.3 PG — SIGNIFICANT CHANGE UP (ref 27–34)
MCHC RBC-ENTMCNC: 28.4 PG — SIGNIFICANT CHANGE UP (ref 27–34)
MCHC RBC-ENTMCNC: 31.8 % — LOW (ref 32–36)
MCHC RBC-ENTMCNC: 34.6 % — SIGNIFICANT CHANGE UP (ref 32–36)
MCV RBC AUTO: 82.2 FL — SIGNIFICANT CHANGE UP (ref 80–100)
MCV RBC AUTO: 86 FL — SIGNIFICANT CHANGE UP (ref 80–100)
MONOCYTES # BLD AUTO: 0.58 K/UL — SIGNIFICANT CHANGE UP (ref 0–0.9)
MONOCYTES # BLD AUTO: 0.73 K/UL — SIGNIFICANT CHANGE UP (ref 0–0.9)
MONOCYTES NFR BLD AUTO: 6.4 % — SIGNIFICANT CHANGE UP (ref 2–14)
MONOCYTES NFR BLD AUTO: 7.1 % — SIGNIFICANT CHANGE UP (ref 2–14)
NEUTROPHILS # BLD AUTO: 4.53 K/UL — SIGNIFICANT CHANGE UP (ref 1.8–7.4)
NEUTROPHILS # BLD AUTO: 9 K/UL — HIGH (ref 1.8–7.4)
NEUTROPHILS NFR BLD AUTO: 55.2 % — SIGNIFICANT CHANGE UP (ref 43–77)
NEUTROPHILS NFR BLD AUTO: 78.8 % — HIGH (ref 43–77)
NRBC # FLD: 0 K/UL — SIGNIFICANT CHANGE UP (ref 0–0)
NRBC # FLD: 0 K/UL — SIGNIFICANT CHANGE UP (ref 0–0)
PLATELET # BLD AUTO: 265 K/UL — SIGNIFICANT CHANGE UP (ref 150–400)
PLATELET # BLD AUTO: 293 K/UL — SIGNIFICANT CHANGE UP (ref 150–400)
PMV BLD: 10.1 FL — SIGNIFICANT CHANGE UP (ref 7–13)
PMV BLD: 10.1 FL — SIGNIFICANT CHANGE UP (ref 7–13)
POTASSIUM SERPL-MCNC: 3.2 MMOL/L — LOW (ref 3.5–5.3)
POTASSIUM SERPL-MCNC: 3.7 MMOL/L — SIGNIFICANT CHANGE UP (ref 3.5–5.3)
POTASSIUM SERPL-SCNC: 3.2 MMOL/L — LOW (ref 3.5–5.3)
POTASSIUM SERPL-SCNC: 3.7 MMOL/L — SIGNIFICANT CHANGE UP (ref 3.5–5.3)
PROT SERPL-MCNC: 5.8 G/DL — LOW (ref 6–8.3)
PROT SERPL-MCNC: 6.9 G/DL — SIGNIFICANT CHANGE UP (ref 6–8.3)
PROTHROM AB SERPL-ACNC: 11.4 SEC — SIGNIFICANT CHANGE UP (ref 9.8–13.1)
PROTHROM AB SERPL-ACNC: 11.7 SEC — SIGNIFICANT CHANGE UP (ref 9.8–13.1)
RBC # BLD: 3.48 M/UL — LOW (ref 3.8–5.2)
RBC # BLD: 4.06 M/UL — SIGNIFICANT CHANGE UP (ref 3.8–5.2)
RBC # FLD: 13.5 % — SIGNIFICANT CHANGE UP (ref 10.3–14.5)
RBC # FLD: 13.6 % — SIGNIFICANT CHANGE UP (ref 10.3–14.5)
SODIUM SERPL-SCNC: 136 MMOL/L — SIGNIFICANT CHANGE UP (ref 135–145)
SODIUM SERPL-SCNC: 138 MMOL/L — SIGNIFICANT CHANGE UP (ref 135–145)
URATE SERPL-MCNC: 3.9 MG/DL — SIGNIFICANT CHANGE UP (ref 2.5–7)
URATE SERPL-MCNC: 4.1 MG/DL — SIGNIFICANT CHANGE UP (ref 2.5–7)
WBC # BLD: 11.41 K/UL — HIGH (ref 3.8–10.5)
WBC # BLD: 8.2 K/UL — SIGNIFICANT CHANGE UP (ref 3.8–10.5)
WBC # FLD AUTO: 11.41 K/UL — HIGH (ref 3.8–10.5)
WBC # FLD AUTO: 8.2 K/UL — SIGNIFICANT CHANGE UP (ref 3.8–10.5)

## 2020-02-21 RX ORDER — FAMOTIDINE 10 MG/ML
20 INJECTION INTRAVENOUS ONCE
Refills: 0 | Status: COMPLETED | OUTPATIENT
Start: 2020-02-21 | End: 2020-02-21

## 2020-02-21 RX ORDER — INSULIN HUMAN 100 [IU]/ML
2 INJECTION, SOLUTION SUBCUTANEOUS
Qty: 100 | Refills: 0 | Status: DISCONTINUED | OUTPATIENT
Start: 2020-02-21 | End: 2020-02-22

## 2020-02-21 RX ORDER — OXYTOCIN 10 UNIT/ML
2 VIAL (ML) INJECTION
Qty: 30 | Refills: 0 | Status: DISCONTINUED | OUTPATIENT
Start: 2020-02-21 | End: 2020-02-22

## 2020-02-21 RX ADMIN — SODIUM CHLORIDE 30 MILLILITER(S): 9 INJECTION INTRAMUSCULAR; INTRAVENOUS; SUBCUTANEOUS at 17:08

## 2020-02-21 RX ADMIN — INSULIN HUMAN 1.5 UNIT(S)/HR: 100 INJECTION, SOLUTION SUBCUTANEOUS at 06:49

## 2020-02-21 RX ADMIN — SODIUM CHLORIDE 75 MILLILITER(S): 9 INJECTION, SOLUTION INTRAVENOUS at 17:58

## 2020-02-21 RX ADMIN — Medication 2 MILLIUNIT(S)/MIN: at 09:07

## 2020-02-21 RX ADMIN — FAMOTIDINE 20 MILLIGRAM(S): 10 INJECTION INTRAVENOUS at 21:27

## 2020-02-21 RX ADMIN — INSULIN HUMAN 2 UNIT(S)/HR: 100 INJECTION, SOLUTION SUBCUTANEOUS at 14:35

## 2020-02-21 RX ADMIN — FAMOTIDINE 20 MILLIGRAM(S): 10 INJECTION INTRAVENOUS at 17:09

## 2020-02-21 RX ADMIN — INSULIN HUMAN 2 UNIT(S)/HR: 100 INJECTION, SOLUTION SUBCUTANEOUS at 19:07

## 2020-02-21 RX ADMIN — SODIUM CHLORIDE 125 MILLILITER(S): 9 INJECTION, SOLUTION INTRAVENOUS at 17:08

## 2020-02-21 NOTE — CHART NOTE - NSCHARTNOTEFT_GEN_A_CORE
Attending Note     Pt is comfortable     AFVSS  Gen in NAD   Abd soft, gravid  Ext: no c/c/e    SVE balloon in place  FHTS 150 mod summer no decels + accels   TOCO q 2-5 min     A/P:  at 37 weeks here for IOL for pre-eclampsia without severe features   start pitocin   BPS ok  continue insulin gtt    Rochelle Allen MD

## 2020-02-21 NOTE — PROGRESS NOTE ADULT - SUBJECTIVE AND OBJECTIVE BOX
Attending note     category 1 tracing   Cervical balloon placed since internal os now noted FT   placed while in lithotomy without issues   venodynes in place since last pm   PLAN for Pitocin to start at 6 am   C Remi

## 2020-02-21 NOTE — CHART NOTE - NSCHARTNOTEFT_GEN_A_CORE
R2 Note 02-21-20     Pt evaluated for progression    VITALS:  T(C): 36.8 (02-21-20 @ 15:30), Max: 37.0 (02-21-20 @ 11:30)  HR: 86 (02-21-20 @ 16:48) (67 - 103)  BP: 107/66 (02-21-20 @ 16:44) (81/50 - 131/59)  RR: 16 (02-21-20 @ 15:30) (14 - 16)  SpO2: 99% (02-21-20 @ 16:48) (90% - 100%)    EFM: , mod summer, +[id number], infrequent variable decel  [place]: Ctx Q2min  VE:1//80/-3      IMPRESSION:   FHR Category: 2  Additional:    PLAN:  -EFM + [place]  -Continue pitocin  -Continue resuscitation    Milton Jacobs PGY-2

## 2020-02-22 LAB
GLUCOSE BLDC GLUCOMTR-MCNC: 104 MG/DL — HIGH (ref 70–99)
GLUCOSE BLDC GLUCOMTR-MCNC: 106 MG/DL — HIGH (ref 70–99)
GLUCOSE BLDC GLUCOMTR-MCNC: 107 MG/DL — HIGH (ref 70–99)
GLUCOSE BLDC GLUCOMTR-MCNC: 110 MG/DL — HIGH (ref 70–99)
GLUCOSE BLDC GLUCOMTR-MCNC: 117 MG/DL — HIGH (ref 70–99)
GLUCOSE BLDC GLUCOMTR-MCNC: 134 MG/DL — HIGH (ref 70–99)
GLUCOSE BLDC GLUCOMTR-MCNC: 88 MG/DL — SIGNIFICANT CHANGE UP (ref 70–99)
GLUCOSE BLDC GLUCOMTR-MCNC: 88 MG/DL — SIGNIFICANT CHANGE UP (ref 70–99)
GLUCOSE BLDC GLUCOMTR-MCNC: 90 MG/DL — SIGNIFICANT CHANGE UP (ref 70–99)
GLUCOSE BLDC GLUCOMTR-MCNC: 97 MG/DL — SIGNIFICANT CHANGE UP (ref 70–99)
GLUCOSE BLDC GLUCOMTR-MCNC: 98 MG/DL — SIGNIFICANT CHANGE UP (ref 70–99)

## 2020-02-22 RX ORDER — SODIUM CHLORIDE 9 MG/ML
1000 INJECTION, SOLUTION INTRAVENOUS
Refills: 0 | Status: DISCONTINUED | OUTPATIENT
Start: 2020-02-22 | End: 2020-02-23

## 2020-02-22 RX ORDER — OXYTOCIN 10 UNIT/ML
41.67 VIAL (ML) INJECTION
Qty: 20 | Refills: 0 | Status: DISCONTINUED | OUTPATIENT
Start: 2020-02-22 | End: 2020-02-23

## 2020-02-22 RX ORDER — IBUPROFEN 200 MG
600 TABLET ORAL EVERY 6 HOURS
Refills: 0 | Status: COMPLETED | OUTPATIENT
Start: 2020-02-22 | End: 2021-01-20

## 2020-02-22 RX ORDER — LANOLIN
1 OINTMENT (GRAM) TOPICAL EVERY 6 HOURS
Refills: 0 | Status: DISCONTINUED | OUTPATIENT
Start: 2020-02-22 | End: 2020-02-25

## 2020-02-22 RX ORDER — HEPARIN SODIUM 5000 [USP'U]/ML
5000 INJECTION INTRAVENOUS; SUBCUTANEOUS EVERY 12 HOURS
Refills: 0 | Status: DISCONTINUED | OUTPATIENT
Start: 2020-02-22 | End: 2020-02-25

## 2020-02-22 RX ORDER — SODIUM CHLORIDE 9 MG/ML
1000 INJECTION, SOLUTION INTRAVENOUS ONCE
Refills: 0 | Status: DISCONTINUED | OUTPATIENT
Start: 2020-02-22 | End: 2020-02-22

## 2020-02-22 RX ORDER — SODIUM CHLORIDE 9 MG/ML
1000 INJECTION INTRAMUSCULAR; INTRAVENOUS; SUBCUTANEOUS
Refills: 0 | Status: DISCONTINUED | OUTPATIENT
Start: 2020-02-22 | End: 2020-02-22

## 2020-02-22 RX ORDER — GLUCAGON INJECTION, SOLUTION 0.5 MG/.1ML
1 INJECTION, SOLUTION SUBCUTANEOUS ONCE
Refills: 0 | Status: DISCONTINUED | OUTPATIENT
Start: 2020-02-22 | End: 2020-02-25

## 2020-02-22 RX ORDER — TETANUS TOXOID, REDUCED DIPHTHERIA TOXOID AND ACELLULAR PERTUSSIS VACCINE, ADSORBED 5; 2.5; 8; 8; 2.5 [IU]/.5ML; [IU]/.5ML; UG/.5ML; UG/.5ML; UG/.5ML
0.5 SUSPENSION INTRAMUSCULAR ONCE
Refills: 0 | Status: DISCONTINUED | OUTPATIENT
Start: 2020-02-22 | End: 2020-02-25

## 2020-02-22 RX ORDER — OXYCODONE HYDROCHLORIDE 5 MG/1
5 TABLET ORAL
Refills: 0 | Status: COMPLETED | OUTPATIENT
Start: 2020-02-22 | End: 2020-02-29

## 2020-02-22 RX ORDER — ONDANSETRON 8 MG/1
4 TABLET, FILM COATED ORAL ONCE
Refills: 0 | Status: COMPLETED | OUTPATIENT
Start: 2020-02-22 | End: 2020-02-22

## 2020-02-22 RX ORDER — DEXTROSE 50 % IN WATER 50 %
25 SYRINGE (ML) INTRAVENOUS ONCE
Refills: 0 | Status: DISCONTINUED | OUTPATIENT
Start: 2020-02-22 | End: 2020-02-25

## 2020-02-22 RX ORDER — INSULIN LISPRO 100/ML
VIAL (ML) SUBCUTANEOUS AT BEDTIME
Refills: 0 | Status: DISCONTINUED | OUTPATIENT
Start: 2020-02-22 | End: 2020-02-25

## 2020-02-22 RX ORDER — DEXTROSE 50 % IN WATER 50 %
15 SYRINGE (ML) INTRAVENOUS ONCE
Refills: 0 | Status: DISCONTINUED | OUTPATIENT
Start: 2020-02-22 | End: 2020-02-25

## 2020-02-22 RX ORDER — DEXTROSE 50 % IN WATER 50 %
12.5 SYRINGE (ML) INTRAVENOUS ONCE
Refills: 0 | Status: DISCONTINUED | OUTPATIENT
Start: 2020-02-22 | End: 2020-02-25

## 2020-02-22 RX ORDER — ACETAMINOPHEN 500 MG
975 TABLET ORAL
Refills: 0 | Status: DISCONTINUED | OUTPATIENT
Start: 2020-02-22 | End: 2020-02-25

## 2020-02-22 RX ORDER — OXYCODONE HYDROCHLORIDE 5 MG/1
5 TABLET ORAL ONCE
Refills: 0 | Status: DISCONTINUED | OUTPATIENT
Start: 2020-02-22 | End: 2020-02-25

## 2020-02-22 RX ORDER — GLYCERIN ADULT
1 SUPPOSITORY, RECTAL RECTAL AT BEDTIME
Refills: 0 | Status: DISCONTINUED | OUTPATIENT
Start: 2020-02-22 | End: 2020-02-25

## 2020-02-22 RX ORDER — SIMETHICONE 80 MG/1
80 TABLET, CHEWABLE ORAL EVERY 4 HOURS
Refills: 0 | Status: DISCONTINUED | OUTPATIENT
Start: 2020-02-22 | End: 2020-02-25

## 2020-02-22 RX ORDER — INSULIN LISPRO 100/ML
VIAL (ML) SUBCUTANEOUS
Refills: 0 | Status: DISCONTINUED | OUTPATIENT
Start: 2020-02-22 | End: 2020-02-25

## 2020-02-22 RX ORDER — CITRIC ACID/SODIUM CITRATE 300-500 MG
30 SOLUTION, ORAL ORAL ONCE
Refills: 0 | Status: COMPLETED | OUTPATIENT
Start: 2020-02-22 | End: 2020-02-22

## 2020-02-22 RX ORDER — KETOROLAC TROMETHAMINE 30 MG/ML
30 SYRINGE (ML) INJECTION EVERY 6 HOURS
Refills: 0 | Status: DISCONTINUED | OUTPATIENT
Start: 2020-02-22 | End: 2020-02-23

## 2020-02-22 RX ORDER — HYDROMORPHONE HYDROCHLORIDE 2 MG/ML
1 INJECTION INTRAMUSCULAR; INTRAVENOUS; SUBCUTANEOUS
Refills: 0 | Status: DISCONTINUED | OUTPATIENT
Start: 2020-02-22 | End: 2020-02-22

## 2020-02-22 RX ORDER — DIPHENHYDRAMINE HCL 50 MG
25 CAPSULE ORAL EVERY 6 HOURS
Refills: 0 | Status: DISCONTINUED | OUTPATIENT
Start: 2020-02-22 | End: 2020-02-25

## 2020-02-22 RX ORDER — FAMOTIDINE 10 MG/ML
20 INJECTION INTRAVENOUS ONCE
Refills: 0 | Status: COMPLETED | OUTPATIENT
Start: 2020-02-22 | End: 2020-02-22

## 2020-02-22 RX ORDER — METOCLOPRAMIDE HCL 10 MG
10 TABLET ORAL ONCE
Refills: 0 | Status: COMPLETED | OUTPATIENT
Start: 2020-02-22 | End: 2020-02-22

## 2020-02-22 RX ORDER — SODIUM CHLORIDE 9 MG/ML
500 INJECTION, SOLUTION INTRAVENOUS ONCE
Refills: 0 | Status: COMPLETED | OUTPATIENT
Start: 2020-02-22 | End: 2020-02-22

## 2020-02-22 RX ORDER — ONDANSETRON 8 MG/1
4 TABLET, FILM COATED ORAL EVERY 6 HOURS
Refills: 0 | Status: DISCONTINUED | OUTPATIENT
Start: 2020-02-22 | End: 2020-02-23

## 2020-02-22 RX ORDER — MAGNESIUM HYDROXIDE 400 MG/1
30 TABLET, CHEWABLE ORAL
Refills: 0 | Status: DISCONTINUED | OUTPATIENT
Start: 2020-02-22 | End: 2020-02-25

## 2020-02-22 RX ORDER — CALCIUM CARBONATE 500(1250)
1 TABLET ORAL THREE TIMES A DAY
Refills: 0 | Status: DISCONTINUED | OUTPATIENT
Start: 2020-02-22 | End: 2020-02-22

## 2020-02-22 RX ORDER — SODIUM CHLORIDE 9 MG/ML
1000 INJECTION, SOLUTION INTRAVENOUS
Refills: 0 | Status: DISCONTINUED | OUTPATIENT
Start: 2020-02-22 | End: 2020-02-22

## 2020-02-22 RX ORDER — CEFAZOLIN SODIUM 1 G
1000 VIAL (EA) INJECTION ONCE
Refills: 0 | Status: COMPLETED | OUTPATIENT
Start: 2020-02-22 | End: 2020-02-22

## 2020-02-22 RX ORDER — SODIUM CHLORIDE 9 MG/ML
1000 INJECTION, SOLUTION INTRAVENOUS
Refills: 0 | Status: DISCONTINUED | OUTPATIENT
Start: 2020-02-22 | End: 2020-02-25

## 2020-02-22 RX ADMIN — Medication 100 MILLIGRAM(S): at 18:14

## 2020-02-22 RX ADMIN — Medication 30 MILLIGRAM(S): at 18:14

## 2020-02-22 RX ADMIN — HYDROMORPHONE HYDROCHLORIDE 1 MILLIGRAM(S): 2 INJECTION INTRAMUSCULAR; INTRAVENOUS; SUBCUTANEOUS at 13:16

## 2020-02-22 RX ADMIN — FAMOTIDINE 20 MILLIGRAM(S): 10 INJECTION INTRAVENOUS at 10:04

## 2020-02-22 RX ADMIN — SODIUM CHLORIDE 75 MILLILITER(S): 9 INJECTION, SOLUTION INTRAVENOUS at 13:06

## 2020-02-22 RX ADMIN — Medication 125 MILLIUNIT(S)/MIN: at 13:00

## 2020-02-22 RX ADMIN — HYDROMORPHONE HYDROCHLORIDE 1 MILLIGRAM(S): 2 INJECTION INTRAMUSCULAR; INTRAVENOUS; SUBCUTANEOUS at 13:45

## 2020-02-22 RX ADMIN — ONDANSETRON 4 MILLIGRAM(S): 8 TABLET, FILM COATED ORAL at 21:48

## 2020-02-22 RX ADMIN — SODIUM CHLORIDE 75 MILLILITER(S): 9 INJECTION INTRAMUSCULAR; INTRAVENOUS; SUBCUTANEOUS at 09:30

## 2020-02-22 RX ADMIN — INSULIN HUMAN 2 UNIT(S)/HR: 100 INJECTION, SOLUTION SUBCUTANEOUS at 07:13

## 2020-02-22 RX ADMIN — SODIUM CHLORIDE 500 MILLILITER(S): 9 INJECTION, SOLUTION INTRAVENOUS at 05:30

## 2020-02-22 RX ADMIN — HEPARIN SODIUM 5000 UNIT(S): 5000 INJECTION INTRAVENOUS; SUBCUTANEOUS at 18:14

## 2020-02-22 RX ADMIN — Medication 10 MILLIGRAM(S): at 10:04

## 2020-02-22 RX ADMIN — ONDANSETRON 4 MILLIGRAM(S): 8 TABLET, FILM COATED ORAL at 15:45

## 2020-02-22 RX ADMIN — Medication 30 MILLILITER(S): at 10:04

## 2020-02-22 RX ADMIN — Medication 30 MILLIGRAM(S): at 18:28

## 2020-02-22 NOTE — OB NEONATOLOGY/PEDIATRICIAN DELIVERY SUMMARY - NSPEDSNEONOTESA_OBGYN_ALL_OB_FT
Baby is a 37+4 wk GA female born to a 40 y/o  mother via C/S where mom was induced for post dates. Maternal history significant for DM2 on Metformin previous to pregnancy and humalog during pregnancy, gHTN, and advanced maternal age. Prenatal history uncomplicated. Maternal blood type A+. Prenatal labs negative, non-reactive, and immune. GBS negative on 2/3. AROM at 0248 on , clear fluids. Baby born vigorous and crying spontaneously. Warmed, dried, stimulated. Apgars 8/9. EOS 0.15. Mom plans to breastfeed and would like hepB.

## 2020-02-22 NOTE — CHART NOTE - NSCHARTNOTEFT_GEN_A_CORE
Informed by RN that pt UOP 100cc / 3hr, concentrated, clear. Pt without complaints at this time. Total maintenance fluids at 115cc/hr, pt maintenance for bodyweight approx 135cc/hr.     Increase KVO to 50cc/hr and give 1000cc LR bolus.    D/w Dr. Remi Yoon PGY-1 Informed by RN that pt UOP 100cc / 3hr, concentrated, clear. Pt without complaints at this time. Total maintenance fluids at 115cc/hr, pt maintenance for bodyweight approx 135cc/hr.     Increase KVO to 50cc/hr and give 500cc LR bolus over 1hr.    D/w Dr. Remi Yoon PGY-1

## 2020-02-22 NOTE — PROGRESS NOTE ADULT - SUBJECTIVE AND OBJECTIVE BOX
Attending note      FHRT category 1 at 20u of pitocin   arrested at 4-5 cm  cervix deviated to the left and   noted very high vertex likely asynclitism   I d/w the patient and her family all implications , all her questions were answered , she verbalized  understanding and singed consent C Remi

## 2020-02-22 NOTE — OB PROVIDER DELIVERY SUMMARY - NSPROVIDERDELIVERYNOTE_OBGYN_ALL_OB_FT
Grossly normal uterus, b/l fallopian tubes and ovaries, subcm fibroid on anterior surface  Live female infant, apgars 8/9, weight 2940g    IVF 2500ml  UOP 200ml Grossly normal uterus, b/l fallopian tubes and ovaries, subcm fibroid on anterior surface  Live female infant, apgars 8/9, weight 2940g   surgeon estimate 900cc  IVF 2500ml  UOP 200ml

## 2020-02-22 NOTE — CHART NOTE - NSCHARTNOTEFT_GEN_A_CORE
Vanessa Zimmer  40 y/o W with T2DM now s/p csection. Patient was on metformin 850mg BID, NPH 38 units qhs, and Humalog 12/10/10 at home. Hgb a1c 5.9.   -humalog sliding scale  -endocrine will see tomorrow

## 2020-02-22 NOTE — PROGRESS NOTE ADULT - SUBJECTIVE AND OBJECTIVE BOX
Attending Note      Vital Signs Last 24 Hrs  T(C): 36.9 (22 Feb 2020 03:38), Max: 37.0 (21 Feb 2020 11:30)  T(F): 98.42 (22 Feb 2020 03:38), Max: 98.6 (21 Feb 2020 11:30)  HR: 70 (22 Feb 2020 04:03) (63 - 105)  BP: 108/65 (22 Feb 2020 04:01) (81/50 - 143/67)  BP(mean): --  RR: 16 (21 Feb 2020 15:30) (14 - 16)  SpO2: 96% (22 Feb 2020 03:58) (82% - 100%)    FETAL HEART RATE: CATEGORY 1    [place]: Q1-2     CERVICAL EXAM: 4CM/50 /-R    PAIN SCALE (0-10):      Assessment/ plan   CERVICAL BALLOON REMOVED   IUPC  placed for contraction pattern       C Remi

## 2020-02-22 NOTE — CHART NOTE - NSCHARTNOTEFT_GEN_A_CORE
NP note    Pt seen for cervical evaluation. Pt comfortable with epidural in place.     Vital Signs Last 24 Hrs  T(C): 36.9 (22 Feb 2020 05:30), Max: 37.0 (21 Feb 2020 11:30)  T(F): 98.42 (22 Feb 2020 05:30), Max: 98.6 (21 Feb 2020 11:30)  HR: 62 (22 Feb 2020 07:15) (61 - 105)  BP: 88/- (22 Feb 2020 07:15) (81/56 - 143/67)  RR: 16 (21 Feb 2020 15:30) (14 - 16)  SpO2: 99% (22 Feb 2020 07:13) (82% - 100%)  /moderate variability/+ accels/no decels  ctx q2-4min  SVE 4/90/-3 unchanged    -Continue pitocin  -D/W Dr. Remi borrero, NP

## 2020-02-22 NOTE — OB RN DELIVERY SUMMARY - NS_SEPSISRSKCALC_OBGYN_ALL_OB_FT
No temperature has been documented for this patient in CPN or on the OB Flowsheet. Ensure the highest temperature during labor was documented on the OB Flowsheet.  No gestational age at birth has been documented. Ensure delivery date/time has been entered above.  Rupture of membranes must be entered above. EOS calculated successfully. EOS Risk Factor: 0.5/1000 live births ([facility] national incidence); GA=37w4d; Temp=98.6; ROM=8.4; GBS='Negative'; Antibiotics='Broad spectrum antibiotics 2-3.9 hrs prior to birth'

## 2020-02-23 ENCOUNTER — TRANSCRIPTION ENCOUNTER (OUTPATIENT)
Age: 40
End: 2020-02-23

## 2020-02-23 DIAGNOSIS — E11.9 TYPE 2 DIABETES MELLITUS WITHOUT COMPLICATIONS: ICD-10-CM

## 2020-02-23 LAB
BASOPHILS # BLD AUTO: 0.03 K/UL — SIGNIFICANT CHANGE UP (ref 0–0.2)
BASOPHILS NFR BLD AUTO: 0.3 % — SIGNIFICANT CHANGE UP (ref 0–2)
EOSINOPHIL # BLD AUTO: 0.13 K/UL — SIGNIFICANT CHANGE UP (ref 0–0.5)
EOSINOPHIL NFR BLD AUTO: 1.3 % — SIGNIFICANT CHANGE UP (ref 0–6)
GLUCOSE BLDC GLUCOMTR-MCNC: 130 MG/DL — HIGH (ref 70–99)
GLUCOSE BLDC GLUCOMTR-MCNC: 158 MG/DL — HIGH (ref 70–99)
GLUCOSE BLDC GLUCOMTR-MCNC: 89 MG/DL — SIGNIFICANT CHANGE UP (ref 70–99)
HCT VFR BLD CALC: 27.8 % — LOW (ref 34.5–45)
HGB BLD-MCNC: 9.2 G/DL — LOW (ref 11.5–15.5)
IMM GRANULOCYTES NFR BLD AUTO: 0.4 % — SIGNIFICANT CHANGE UP (ref 0–1.5)
LYMPHOCYTES # BLD AUTO: 1.8 K/UL — SIGNIFICANT CHANGE UP (ref 1–3.3)
LYMPHOCYTES # BLD AUTO: 17.6 % — SIGNIFICANT CHANGE UP (ref 13–44)
MCHC RBC-ENTMCNC: 27.5 PG — SIGNIFICANT CHANGE UP (ref 27–34)
MCHC RBC-ENTMCNC: 33.1 % — SIGNIFICANT CHANGE UP (ref 32–36)
MCV RBC AUTO: 83.2 FL — SIGNIFICANT CHANGE UP (ref 80–100)
MONOCYTES # BLD AUTO: 0.68 K/UL — SIGNIFICANT CHANGE UP (ref 0–0.9)
MONOCYTES NFR BLD AUTO: 6.6 % — SIGNIFICANT CHANGE UP (ref 2–14)
NEUTROPHILS # BLD AUTO: 7.57 K/UL — HIGH (ref 1.8–7.4)
NEUTROPHILS NFR BLD AUTO: 73.8 % — SIGNIFICANT CHANGE UP (ref 43–77)
NRBC # FLD: 0 K/UL — SIGNIFICANT CHANGE UP (ref 0–0)
PLATELET # BLD AUTO: 252 K/UL — SIGNIFICANT CHANGE UP (ref 150–400)
PMV BLD: 9.9 FL — SIGNIFICANT CHANGE UP (ref 7–13)
RBC # BLD: 3.34 M/UL — LOW (ref 3.8–5.2)
RBC # FLD: 13.4 % — SIGNIFICANT CHANGE UP (ref 10.3–14.5)
WBC # BLD: 10.25 K/UL — SIGNIFICANT CHANGE UP (ref 3.8–10.5)
WBC # FLD AUTO: 10.25 K/UL — SIGNIFICANT CHANGE UP (ref 3.8–10.5)

## 2020-02-23 PROCEDURE — 99254 IP/OBS CNSLTJ NEW/EST MOD 60: CPT

## 2020-02-23 RX ORDER — METFORMIN HYDROCHLORIDE 850 MG/1
0 TABLET ORAL
Qty: 0 | Refills: 0 | DISCHARGE

## 2020-02-23 RX ORDER — OXYCODONE HYDROCHLORIDE 5 MG/1
5 TABLET ORAL
Refills: 0 | Status: DISCONTINUED | OUTPATIENT
Start: 2020-02-23 | End: 2020-02-25

## 2020-02-23 RX ORDER — FERROUS SULFATE 325(65) MG
325 TABLET ORAL THREE TIMES A DAY
Refills: 0 | Status: DISCONTINUED | OUTPATIENT
Start: 2020-02-23 | End: 2020-02-25

## 2020-02-23 RX ORDER — ASCORBIC ACID 60 MG
500 TABLET,CHEWABLE ORAL DAILY
Refills: 0 | Status: DISCONTINUED | OUTPATIENT
Start: 2020-02-23 | End: 2020-02-25

## 2020-02-23 RX ORDER — IBUPROFEN 200 MG
600 TABLET ORAL EVERY 6 HOURS
Refills: 0 | Status: DISCONTINUED | OUTPATIENT
Start: 2020-02-23 | End: 2020-02-25

## 2020-02-23 RX ADMIN — Medication 30 MILLIGRAM(S): at 12:26

## 2020-02-23 RX ADMIN — HEPARIN SODIUM 5000 UNIT(S): 5000 INJECTION INTRAVENOUS; SUBCUTANEOUS at 17:57

## 2020-02-23 RX ADMIN — Medication 600 MILLIGRAM(S): at 17:57

## 2020-02-23 RX ADMIN — Medication 30 MILLIGRAM(S): at 04:17

## 2020-02-23 RX ADMIN — Medication 600 MILLIGRAM(S): at 23:45

## 2020-02-23 RX ADMIN — Medication 975 MILLIGRAM(S): at 22:00

## 2020-02-23 RX ADMIN — HEPARIN SODIUM 5000 UNIT(S): 5000 INJECTION INTRAVENOUS; SUBCUTANEOUS at 06:02

## 2020-02-23 RX ADMIN — Medication 600 MILLIGRAM(S): at 18:57

## 2020-02-23 RX ADMIN — Medication 600 MILLIGRAM(S): at 23:01

## 2020-02-23 RX ADMIN — Medication 975 MILLIGRAM(S): at 06:30

## 2020-02-23 RX ADMIN — Medication 975 MILLIGRAM(S): at 15:40

## 2020-02-23 RX ADMIN — SIMETHICONE 80 MILLIGRAM(S): 80 TABLET, CHEWABLE ORAL at 21:07

## 2020-02-23 RX ADMIN — Medication 975 MILLIGRAM(S): at 21:06

## 2020-02-23 RX ADMIN — Medication 975 MILLIGRAM(S): at 16:40

## 2020-02-23 RX ADMIN — Medication 30 MILLIGRAM(S): at 03:47

## 2020-02-23 RX ADMIN — Medication 975 MILLIGRAM(S): at 06:00

## 2020-02-23 RX ADMIN — Medication 30 MILLIGRAM(S): at 11:26

## 2020-02-23 RX ADMIN — ONDANSETRON 4 MILLIGRAM(S): 8 TABLET, FILM COATED ORAL at 03:47

## 2020-02-23 NOTE — DISCHARGE NOTE OB - MATERIALS PROVIDED
Back To Sleep Handout/Shaken Baby Prevention Handout/Birth Certificate Instructions/[facility] Hearing Screen Program/Vaccinations/  Immunization Record/Breastfeeding Mother’s Support Group Information/Guide to Postpartum Care/Discharge Medication Information for Patients and Families Pocket Guide/[facility] [place] Screening Program/Breastfeeding Guide and Packet/Breastfeeding Log

## 2020-02-23 NOTE — DISCHARGE NOTE OB - HOSPITAL COURSE
patient was induced but was an arrest of dilation and had a Primary c/s for a viable female infant. 40 yo P0 at 38 wks admitted for induction due to Mild preeclampsia ( Labile BP and over 400mg of protein/ 24 hr urine collection) and suboptimal control of fasting glucoses on a type 2 pregestational diabetic. The patient was induced ( PO Cytotec, Cervical Balloon placement, Pitocin over over 48 hours)  but had arrest of dilation at 5 cm . She had a 1 LFT Primary  section and had  a viable female infant.

## 2020-02-23 NOTE — CONSULT NOTE ADULT - SUBJECTIVE AND OBJECTIVE BOX
Hx from pt and her     HPI:  Patient is a 39 year old woman now pod1 after , with T2DM, well controlled.  She had Diabetes prior to her pregnancy, and took metformin 850 mg twice daily.   She had not been checking glucose,  reports that hba1c prior to pregnancy was 6.0.  She has no known microvascular disease, has not recently seen an ophthalmologist.  During pregnancy, she has been trying to limit carbohydrate intake and monitor blood glucose, required insulin during pregnancy (NPH 48 units at bedtime and humalog 18 units before meals).  Glucose values since delivery have been well controlled.  She reports no polyuria/polydipsia., no numbness/tingling in fingers/toes.  No blurry vision  Eating meals.   She has not recently seen an endocrinologist, had seen Dr Chavez in [place] in the past    she reports no history of hypertension or hyperlipidemia      PAST MEDICAL & SURGICAL HISTORY:  Spontaneous  without complication: d&amp;C2  History of breast augmentation  History of cholecystectomy  H/O abdominal surgery: abdominalplasty      FAMILY HISTORY: Mother and father have T2DM      Social History: From [place] years. Lives with her .  Never smoker.  No alcohol/drug use. Works as loNearboxe attendant at [employer]    Outpatient Medications: NPH and Humalog as above, metformin 850 bid    MEDICATIONS  (STANDING):  acetaminophen   Tablet .. 975 milliGRAM(s) Oral <User Schedule>  dextrose 5%. 1000 milliLiter(s) (50 mL/Hr) IV Continuous <Continuous>  dextrose 50% Injectable 12.5 Gram(s) IV Push once  dextrose 50% Injectable 25 Gram(s) IV Push once  dextrose 50% Injectable 25 Gram(s) IV Push once  diphtheria/tetanus/pertussis (acellular) Vaccine (ADAcel) 0.5 milliLiter(s) IntraMuscular once  heparin  Injectable 5000 Unit(s) SubCutaneous every 12 hours  ibuprofen  Tablet. 600 milliGRAM(s) Oral every 6 hours  insulin lispro (HumaLOG) corrective regimen sliding scale   SubCutaneous three times a day before meals  insulin lispro (HumaLOG) corrective regimen sliding scale   SubCutaneous at bedtime  ketorolac   Injectable 30 milliGRAM(s) IV Push every 6 hours  lactated ringers. 1000 milliLiter(s) (125 mL/Hr) IV Continuous <Continuous>  oxytocin Infusion 41.667 milliUNIT(s)/Min (125 mL/Hr) IV Continuous <Continuous>    MEDICATIONS  (PRN):  dextrose 40% Gel 15 Gram(s) Oral once PRN Blood Glucose LESS THAN 70 milliGRAM(s)/deciliter  diphenhydrAMINE 25 milliGRAM(s) Oral every 6 hours PRN Itching  glucagon  Injectable 1 milliGRAM(s) IntraMuscular once PRN Glucose LESS THAN 70 milligrams/deciliter  glycerin Suppository - Adult 1 Suppository(s) Rectal at bedtime PRN Constipation  lanolin Ointment 1 Application(s) Topical every 6 hours PRN Sore Nipples  magnesium hydroxide Suspension 30 milliLiter(s) Oral two times a day PRN Constipation  ondansetron Injectable 4 milliGRAM(s) IV Push every 6 hours PRN Nausea  oxyCODONE    IR 5 milliGRAM(s) Oral every 3 hours PRN Moderate to Severe Pain (4-10)  oxyCODONE    IR 5 milliGRAM(s) Oral once PRN Moderate to Severe Pain (4-10)  simethicone 80 milliGRAM(s) Chew every 4 hours PRN Gas      Allergies    No Known Allergies    Review of Systems:  Constitutional: No fever  Eyes: No blurry vision  Neuro: No headache  HEENT: No throat pain  Cardiovascular: No chest pain  Respiratory: No SOB, no cough  GI: Has pain at incision site, has not yet had bowel movement since surgery  : No dysuria  Skin: no rash  Psych: no depression  Endocrine: as noted in HPI  Hem/lymph: +leg swelling      PHYSICAL EXAM:  VITALS: T(C): 36.8 (20 @ 05:54)  T(F): 98.2 (20 @ 05:54), Max: 99.1 (20 @ 16:45)  HR: 77 (20 @ 05:54) (77 - 98)  BP: 101/60 (20 @ 05:54) (101/60 - 116/61)  RR:  (13 - 20)  SpO2:  (97% - 100%)  Wt(kg): 95.3 kg  GENERAL: NAD,   EYES: No proptosis,  HEENT:  Atraumatic, Normocephalic,   THYROID: Normal size, no palpable nodules  RESPIRATORY: Clear to auscultation bilaterally  CARDIOVASCULAR: Regular rhythm; No murmurs; b/l LE pitting edema  GI: Soft,  normal bowel sounds  SKIN: incision dressed  MUSCULOSKELETAL: normal strength  NEURO: extraocular movements intact, no tremor, normal reflexes  PSYCH: Alert and oriented x 3, normal affect, normal mood      POCT Blood Glucose.: 89 mg/dL (20 @ 08:05)  POCT Blood Glucose.: 134 mg/dL (20 @ 22:14)  POCT Blood Glucose.: 90 mg/dL (20 @ 16:48)  POCT Blood Glucose.: 88 mg/dL (20 @ 10:03)  POCT Blood Glucose.: 106 mg/dL (20 @ 09:07)  POCT Blood Glucose.: 88 mg/dL (20 @ 08:01)  POCT Blood Glucose.: 97 mg/dL (20 @ 06:57)  POCT Blood Glucose.: 98 mg/dL (20 @ 06:01)  POCT Blood Glucose.: 106 mg/dL (20 @ 05:13)  POCT Blood Glucose.: 104 mg/dL (20 @ 04:07)  POCT Blood Glucose.: 117 mg/dL (20 @ 03:22)  POCT Blood Glucose.: 110 mg/dL (20 @ 02:04)  POCT Blood Glucose.: 106 mg/dL (20 @ 01:05)  POCT Blood Glucose.: 107 mg/dL (20 @ 00:09)  POCT Blood Glucose.: 103 mg/dL (20 @ 23:03)  POCT Blood Glucose.: 100 mg/dL (20 @ 22:00)  POCT Blood Glucose.: 101 mg/dL (20 @ 21:00)  POCT Blood Glucose.: 99 mg/dL (20 @ 20:01)  POCT Blood Glucose.: 105 mg/dL (20 @ 19:05)  POCT Blood Glucose.: 120 mg/dL (20 @ 17:55)  POCT Blood Glucose.: 124 mg/dL (20 @ 17:00)  POCT Blood Glucose.: 102 mg/dL (20 @ 15:32)  POCT Blood Glucose.: 100 mg/dL (20 @ 14:30)  POCT Blood Glucose.: 113 mg/dL (20 @ 13:48)  POCT Blood Glucose.: 121 mg/dL (20 @ 12:49)  POCT Blood Glucose.: 133 mg/dL (20 @ 11:32)  POCT Blood Glucose.: 135 mg/dL (20 @ 10:39)  POCT Blood Glucose.: 132 mg/dL (20 @ 09:41)  POCT Blood Glucose.: 133 mg/dL (20 @ 08:34)  POCT Blood Glucose.: 120 mg/dL (20 @ 07:30)  POCT Blood Glucose.: 108 mg/dL (20 @ 06:37)  POCT Blood Glucose.: 97 mg/dL (20 @ 05:48)  POCT Blood Glucose.: 119 mg/dL (20 @ 04:51)  POCT Blood Glucose.: 112 mg/dL (20 @ 03:31)  POCT Blood Glucose.: 109 mg/dL (20 @ 02:30)  POCT Blood Glucose.: 109 mg/dL (20 @ 01:33)  POCT Blood Glucose.: 99 mg/dL (20 @ 00:35)  POCT Blood Glucose.: 103 mg/dL (20 @ 23:35)  POCT Blood Glucose.: 112 mg/dL (20 @ 22:37)  POCT Blood Glucose.: 110 mg/dL (20 @ 21:35)  POCT Blood Glucose.: 116 mg/dL (20 @ 20:36)  POCT Blood Glucose.: 109 mg/dL (20 @ 19:28)  POCT Blood Glucose.: 96 mg/dL (20 @ 18:43)  POCT Blood Glucose.: 109 mg/dL (20 @ 17:36)  POCT Blood Glucose.: 110 mg/dL (20 @ 16:34)  POCT Blood Glucose.: 129 mg/dL (20 @ 15:27)  POCT Blood Glucose.: 153 mg/dL (20 @ 14:26)  POCT Blood Glucose.: 148 mg/dL (20 @ 13:12)  POCT Blood Glucose.: 135 mg/dL (20 @ 12:00)  POCT Blood Glucose.: 143 mg/dL (20 @ 10:59)                            9.2    10.25 )-----------( 252      ( 2020 05:52 )             27.8           138  |  107  |  9   ----------------------------<  109<H>  3.7   |  19<L>  |  0.66    EGFR if : 130  EGFR if non : 112    Ca    9.1          TPro  6.9  /  Alb  3.3  /  TBili  0.3  /  DBili  x   /  AST  16  /  ALT  12  /  AlkPhos  111      Thyroid Function Tests:      Hemoglobin A1C, Whole Blood: 5.9 % <H> [4.0 - 5.6] (20 @ 05:59)

## 2020-02-23 NOTE — PROGRESS NOTE ADULT - SUBJECTIVE AND OBJECTIVE BOX
Postpartum Note,  Section  She is a  39y woman who is now post-operative day: 1    Subjective:  Feels well; no complaints      Physical exam:    Vital Signs Last 24 Hrs  T(C): 36.8 (2020 05:54), Max: 37.3 (2020 16:45)  T(F): 98.2 (2020 05:54), Max: 99.1 (2020 16:45)  HR: 77 (2020 05:54) (75 - 98)  BP: 101/60 (2020 05:54) (101/60 - 116/61)  BP(mean): 69 (2020 14:15) (69 - 79)  RR: 16 (2020 05:54) (13 - 20)  SpO2: 98% (2020 05:54) (93% - 100%)    Gen: NAD  Breast: Soft, nontender, not engorged.  Abdomen: Soft, nontender, no distension , firm uterine fundus at umbilicus.  Incision: Clean, dry, and intact with steri strips  Pelvic: Normal lochia noted  Ext: No calf tenderness    LABS:                        9.2    10.25 )-----------( 252      ( 2020 05:52 )             27.8     .        MEDICATIONS  (STANDING):  acetaminophen   Tablet .. 975 milliGRAM(s) Oral <User Schedule>  dextrose 5%. 1000 milliLiter(s) (50 mL/Hr) IV Continuous <Continuous>  dextrose 50% Injectable 12.5 Gram(s) IV Push once  dextrose 50% Injectable 25 Gram(s) IV Push once  dextrose 50% Injectable 25 Gram(s) IV Push once  diphtheria/tetanus/pertussis (acellular) Vaccine (ADAcel) 0.5 milliLiter(s) IntraMuscular once  heparin  Injectable 5000 Unit(s) SubCutaneous every 12 hours  ibuprofen  Tablet. 600 milliGRAM(s) Oral every 6 hours  insulin lispro (HumaLOG) corrective regimen sliding scale   SubCutaneous three times a day before meals  insulin lispro (HumaLOG) corrective regimen sliding scale   SubCutaneous at bedtime  ketorolac   Injectable 30 milliGRAM(s) IV Push every 6 hours  lactated ringers. 1000 milliLiter(s) (125 mL/Hr) IV Continuous <Continuous>  oxytocin Infusion 41.667 milliUNIT(s)/Min (125 mL/Hr) IV Continuous <Continuous>    MEDICATIONS  (PRN):  dextrose 40% Gel 15 Gram(s) Oral once PRN Blood Glucose LESS THAN 70 milliGRAM(s)/deciliter  diphenhydrAMINE 25 milliGRAM(s) Oral every 6 hours PRN Itching  glucagon  Injectable 1 milliGRAM(s) IntraMuscular once PRN Glucose LESS THAN 70 milligrams/deciliter  glycerin Suppository - Adult 1 Suppository(s) Rectal at bedtime PRN Constipation  lanolin Ointment 1 Application(s) Topical every 6 hours PRN Sore Nipples  magnesium hydroxide Suspension 30 milliLiter(s) Oral two times a day PRN Constipation  ondansetron Injectable 4 milliGRAM(s) IV Push every 6 hours PRN Nausea  oxyCODONE    IR 5 milliGRAM(s) Oral every 3 hours PRN Moderate to Severe Pain (4-10)  oxyCODONE    IR 5 milliGRAM(s) Oral once PRN Moderate to Severe Pain (4-10)  simethicone 80 milliGRAM(s) Chew every 4 hours PRN Gas

## 2020-02-23 NOTE — DISCHARGE NOTE OB - CARE PROVIDER_API CALL
Summer Khalil)  Obstetrics and Gynecology  [address] [place] [address]  Phone: [phone]  Fax: [phone]  Follow Up Time:

## 2020-02-23 NOTE — DISCHARGE NOTE OB - CARE PLAN
Principal Discharge DX:	 delivery delivered  Goal:	return to normal ADLS  Assessment and plan of treatment:	stable; vaginal rest and no heavy lifting

## 2020-02-23 NOTE — PROGRESS NOTE ADULT - SUBJECTIVE AND OBJECTIVE BOX
OB Anesthesia Pain Service and Post OP check Note    Postop Day:  _1_ s/p   C- Section    THERAPY:  [  ] Spinal morphine:___mg  [x  ] Epidural morphine :_3__mg  [  ] IV PCA Hydromorphone: ___mg bolus dose every 6 minutes to ___mg 4 hour limit      Pain:               [  x ] Controlled on current regimen               [  ]  Other:    Sedation:	[ x] Alert	     [  ] Drowsy        [  ] Arousable	[  ] Asleep	     [  ] Unresponsive    Side Effects:	[x  ] None	     [  ] Nausea        [  ] Pruritus            [  ] Weakness     [  ] Numbness            [  ] Headache      ASSESSMENT/ PLAN:    [   ] Side effects resolving      [   ] Patient made aware of PRN meds available     [ x] Discontinue as per 24 hour protocol orders  & switch to PRN pain medications  as per OB service     [   ] Continue     Doing well, no anesthetic complications or complaints noted or reported.  Pain is controlled.

## 2020-02-23 NOTE — DISCHARGE NOTE OB - TEXT 3
Oriented - self; Oriented - place; Oriented - time MAY SHOWER and let soap and water run down the incision and then Dab dry the incision

## 2020-02-23 NOTE — PROGRESS NOTE ADULT - SUBJECTIVE AND OBJECTIVE BOX
OB Progress Note:  Delivery, POD#1    S: 40yo with DM2 and PEC now POD#1 s/p LTCS for arrest . Her pain is well controlled. She is tolerating a regular diet. She has not yet passed flatus. She is ambulating without difficulty. Voiding spontaneously. Denies heavy vaginal bleeding. Denies headaches, vision changes, chest pain, SOB, RUQ pain. Denies N/V. Denies lightheadedness/dizziness.     O:   Vital Signs Last 24 Hrs  T(C): 37 (2020 01:33), Max: 37.3 (2020 16:45)  T(F): 98.6 (:33), Max: 99.1 (2020 16:45)  HR: 85 (:) (60 - 105)  BP: 108/62 (:) (85/50 - 116/61)  BP(mean): 69 (2020 14:15) (69 - 79)  RR: 18 (:) (13 - 20)  SpO2: 98% (:) (85% - 100%)    Labs:  Blood type: A Positive  Rubella IgG: Positive (02-10 @ 17:36)  RPR: Negative                          11.1<L>   11.41<H> >-----------< 293    (  @ 20:55 )             34.9                        9.9<L>   8.20 >-----------< 265    (  @ 05:10 )             28.6<L>    20 20:55      138  |  107  |  9   ----------------------------<  109<H>  3.7   |  19<L>  |  0.66    20 05:10      136  |  108<H>  |  6<L>  ----------------------------<  114<H>  3.2<L>   |  16<L>  |  0.45<L>        Ca    9.1      2020 20:55  Ca    8.0<L>      2020 05:10    TPro  6.9  /  Alb  3.3  /  TBili  0.3  /  DBili  x   /  AST  16  /  ALT  12  /  AlkPhos  111  20 @ 20:55  TPro  5.8<L>  /  Alb  2.8<L>  /  TBili  0.2  /  DBili  x   /  AST  12  /  ALT  11  /  AlkPhos  86  20 @ 05:10          PE:  General: NAD  Abdomen: Mildly distended, appropriately tender, Fundus firm, incision c/d/i.  VE: No heavy vaginal bleeding  Extremities: No erythema, no pitting edema

## 2020-02-23 NOTE — CONSULT NOTE ADULT - ATTENDING COMMENTS
Izzy Lo MD  on 2/23/20: pager   Other times: Diabetes team: [phone] business hours  [phone] night/weekend

## 2020-02-23 NOTE — DISCHARGE NOTE OB - PATIENT PORTAL LINK FT
You can access the FollowMyHealth Patient Portal offered by [facility] by registering at the following website: [email or web address]. By joining [facility]’s FollowMyHealth portal, you will also be able to view your health information using other applications (apps) compatible with our system.

## 2020-02-23 NOTE — CONSULT NOTE ADULT - ASSESSMENT
39 year old woman with T2DM, currently well controlled, post .     T2DM -   Discussed with patient and  that insulin requirements drop drastically postpartum.   If glucose was well controlled, without insulin before pregancy then she will not likely require insulin afterwards.  Counseled pt and  on importance of maintaining carbohydrate consistent diet.    Glucose currently well controlled on correctional insulin only.    Recommend continuing Humalog correctional scale with POC glucose before meals and at bedtime.    If glucose values start to rise, will consider resuming metformin  Pt should follow up with Endocrinology post discharge, prefers [place] location ( Dr Alejandro Hernandez or Dr Christi Salazar)

## 2020-02-24 LAB
GLUCOSE BLDC GLUCOMTR-MCNC: 164 MG/DL — HIGH (ref 70–99)
GLUCOSE BLDC GLUCOMTR-MCNC: 199 MG/DL — HIGH (ref 70–99)
GLUCOSE BLDC GLUCOMTR-MCNC: 86 MG/DL — SIGNIFICANT CHANGE UP (ref 70–99)
GLUCOSE BLDC GLUCOMTR-MCNC: 87 MG/DL — SIGNIFICANT CHANGE UP (ref 70–99)

## 2020-02-24 RX ORDER — SENNA PLUS 8.6 MG/1
1 TABLET ORAL
Refills: 0 | Status: DISCONTINUED | OUTPATIENT
Start: 2020-02-24 | End: 2020-02-25

## 2020-02-24 RX ADMIN — Medication 600 MILLIGRAM(S): at 05:52

## 2020-02-24 RX ADMIN — Medication 500 MILLIGRAM(S): at 13:11

## 2020-02-24 RX ADMIN — OXYCODONE HYDROCHLORIDE 5 MILLIGRAM(S): 5 TABLET ORAL at 14:10

## 2020-02-24 RX ADMIN — OXYCODONE HYDROCHLORIDE 5 MILLIGRAM(S): 5 TABLET ORAL at 08:42

## 2020-02-24 RX ADMIN — Medication 1 TABLET(S): at 13:11

## 2020-02-24 RX ADMIN — MAGNESIUM HYDROXIDE 30 MILLILITER(S): 400 TABLET, CHEWABLE ORAL at 05:16

## 2020-02-24 RX ADMIN — HEPARIN SODIUM 5000 UNIT(S): 5000 INJECTION INTRAVENOUS; SUBCUTANEOUS at 05:15

## 2020-02-24 RX ADMIN — Medication 600 MILLIGRAM(S): at 18:14

## 2020-02-24 RX ADMIN — Medication 325 MILLIGRAM(S): at 13:11

## 2020-02-24 RX ADMIN — OXYCODONE HYDROCHLORIDE 5 MILLIGRAM(S): 5 TABLET ORAL at 09:41

## 2020-02-24 RX ADMIN — HEPARIN SODIUM 5000 UNIT(S): 5000 INJECTION INTRAVENOUS; SUBCUTANEOUS at 17:46

## 2020-02-24 RX ADMIN — Medication 975 MILLIGRAM(S): at 21:42

## 2020-02-24 RX ADMIN — SENNA PLUS 1 TABLET(S): 8.6 TABLET ORAL at 17:45

## 2020-02-24 RX ADMIN — OXYCODONE HYDROCHLORIDE 5 MILLIGRAM(S): 5 TABLET ORAL at 01:33

## 2020-02-24 RX ADMIN — OXYCODONE HYDROCHLORIDE 5 MILLIGRAM(S): 5 TABLET ORAL at 02:00

## 2020-02-24 RX ADMIN — Medication 325 MILLIGRAM(S): at 08:42

## 2020-02-24 RX ADMIN — Medication 600 MILLIGRAM(S): at 18:44

## 2020-02-24 RX ADMIN — OXYCODONE HYDROCHLORIDE 5 MILLIGRAM(S): 5 TABLET ORAL at 13:36

## 2020-02-24 RX ADMIN — Medication 325 MILLIGRAM(S): at 21:42

## 2020-02-24 RX ADMIN — Medication 600 MILLIGRAM(S): at 05:16

## 2020-02-24 RX ADMIN — Medication 975 MILLIGRAM(S): at 22:42

## 2020-02-24 RX ADMIN — Medication 600 MILLIGRAM(S): at 13:11

## 2020-02-24 RX ADMIN — Medication 600 MILLIGRAM(S): at 14:10

## 2020-02-24 NOTE — PROGRESS NOTE ADULT - SUBJECTIVE AND OBJECTIVE BOX
Postpartum Note,  Section  She is a  39y woman who is now post-operative day: 2    Subjective:  Feels well; no complaints      Physical exam:    Vital Signs Last 24 Hrs  T(C): 36.7 (2020 14:19), Max: 36.7 (2020 21:26)  T(F): 98.1 (2020 14:19), Max: 98.1 (2020 14:19)  HR: 85 (2020 14:19) (70 - 85)  BP: 107/58 (2020 14:19) (97/63 - 107/58)  BP(mean): --  RR: 18 (2020 14:19) (18 - 20)  SpO2: 100% (2020 14:19) (96% - 100%)    Gen: NAD  Breast: Soft, nontender, not engorged.  Abdomen: Soft, nontender, no distension , firm uterine fundus at umbilicus.  Incision: Clean, dry, and intact.  Pelvic: Normal lochia noted  Ext: No calf tenderness    LABS:                        9.2    10.25 )-----------( 252      ( 2020 05:52 )             27.8       Rubella status:         MEDICATIONS  (STANDING):  acetaminophen   Tablet .. 975 milliGRAM(s) Oral <User Schedule>  ascorbic acid 500 milliGRAM(s) Oral daily  dextrose 5%. 1000 milliLiter(s) (50 mL/Hr) IV Continuous <Continuous>  dextrose 50% Injectable 12.5 Gram(s) IV Push once  dextrose 50% Injectable 25 Gram(s) IV Push once  dextrose 50% Injectable 25 Gram(s) IV Push once  diphtheria/tetanus/pertussis (acellular) Vaccine (ADAcel) 0.5 milliLiter(s) IntraMuscular once  ferrous    sulfate 325 milliGRAM(s) Oral three times a day  heparin  Injectable 5000 Unit(s) SubCutaneous every 12 hours  ibuprofen  Tablet. 600 milliGRAM(s) Oral every 6 hours  insulin lispro (HumaLOG) corrective regimen sliding scale   SubCutaneous three times a day before meals  insulin lispro (HumaLOG) corrective regimen sliding scale   SubCutaneous at bedtime  prenatal multivitamin 1 Tablet(s) Oral daily    MEDICATIONS  (PRN):  dextrose 40% Gel 15 Gram(s) Oral once PRN Blood Glucose LESS THAN 70 milliGRAM(s)/deciliter  diphenhydrAMINE 25 milliGRAM(s) Oral every 6 hours PRN Itching  glucagon  Injectable 1 milliGRAM(s) IntraMuscular once PRN Glucose LESS THAN 70 milligrams/deciliter  glycerin Suppository - Adult 1 Suppository(s) Rectal at bedtime PRN Constipation  lanolin Ointment 1 Application(s) Topical every 6 hours PRN Sore Nipples  magnesium hydroxide Suspension 30 milliLiter(s) Oral two times a day PRN Constipation  oxyCODONE    IR 5 milliGRAM(s) Oral once PRN Moderate to Severe Pain (4-10)  oxyCODONE    IR 5 milliGRAM(s) Oral every 3 hours PRN Moderate to Severe Pain (4-10)  senna 1 Tablet(s) Oral two times a day PRN Constipation  simethicone 80 milliGRAM(s) Chew every 4 hours PRN Gas

## 2020-02-24 NOTE — PROGRESS NOTE ADULT - SUBJECTIVE AND OBJECTIVE BOX
OB Progress Note: LTCS, POD#2    S: 40yo POD#2 s/p pLTCS for arrest. Pain is well controlled. She is tolerating a regular diet and passing flatus. She is voiding spontaneously, and ambulating without difficulty. Denies CP/SOB. Denies lightheadedness/dizziness. Denies N/V. Denies HA, changes in vision, RUQ pain.     O:  Vital Signs Last 24 Hrs  T(C): 36.7 (23 Feb 2020 21:26), Max: 36.8 (23 Feb 2020 05:54)  HR: 76 (23 Feb 2020 21:26) (76 - 93)  BP: 105/65 (23 Feb 2020 21:26) (101/60 - 106/69)  RR: 20 (23 Feb 2020 21:26) (16 - 20)  SpO2: 100% (23 Feb 2020 21:26) (98% - 100%)    Physical exam:  General: NAD  Abdomen: Soft, appropriately tender  Incision: Pfannenstiel, C/D/I  Extremities: Nontender    MEDICATIONS  (STANDING):  acetaminophen   Tablet .. 975 milliGRAM(s) Oral <User Schedule>  ascorbic acid 500 milliGRAM(s) Oral daily  dextrose 5%. 1000 milliLiter(s) (50 mL/Hr) IV Continuous <Continuous>  dextrose 50% Injectable 12.5 Gram(s) IV Push once  dextrose 50% Injectable 25 Gram(s) IV Push once  dextrose 50% Injectable 25 Gram(s) IV Push once  diphtheria/tetanus/pertussis (acellular) Vaccine (ADAcel) 0.5 milliLiter(s) IntraMuscular once  ferrous    sulfate 325 milliGRAM(s) Oral three times a day  heparin  Injectable 5000 Unit(s) SubCutaneous every 12 hours  ibuprofen  Tablet. 600 milliGRAM(s) Oral every 6 hours  insulin lispro (HumaLOG) corrective regimen sliding scale   SubCutaneous three times a day before meals  insulin lispro (HumaLOG) corrective regimen sliding scale   SubCutaneous at bedtime  prenatal multivitamin 1 Tablet(s) Oral daily      MEDICATIONS  (PRN):  dextrose 40% Gel 15 Gram(s) Oral once PRN Blood Glucose LESS THAN 70 milliGRAM(s)/deciliter  diphenhydrAMINE 25 milliGRAM(s) Oral every 6 hours PRN Itching  glucagon  Injectable 1 milliGRAM(s) IntraMuscular once PRN Glucose LESS THAN 70 milligrams/deciliter  glycerin Suppository - Adult 1 Suppository(s) Rectal at bedtime PRN Constipation  lanolin Ointment 1 Application(s) Topical every 6 hours PRN Sore Nipples  magnesium hydroxide Suspension 30 milliLiter(s) Oral two times a day PRN Constipation  oxyCODONE    IR 5 milliGRAM(s) Oral once PRN Moderate to Severe Pain (4-10)  oxyCODONE    IR 5 milliGRAM(s) Oral every 3 hours PRN Moderate to Severe Pain (4-10)  simethicone 80 milliGRAM(s) Chew every 4 hours PRN Gas      Labs:  Blood type: A Positive  Rubella IgG: Positive (02-10 @ 17:36)  RPR: Negative                          9.2<L>   10.25 >-----------< 252    ( 02-23 @ 05:52 )             27.8<L>                        11.1<L>   11.41<H> >-----------< 293    ( 02-21 @ 20:55 )             34.9                        9.9<L>   8.20 >-----------< 265    ( 02-21 @ 05:10 )             28.6<L>    02-21-20 @ 20:55      138  |  107  |  9   ----------------------------<  109<H>  3.7   |  19<L>  |  0.66    02-21-20 @ 05:10      136  |  108<H>  |  6<L>  ----------------------------<  114<H>  3.2<L>   |  16<L>  |  0.45<L>        Ca    9.1      21 Feb 2020 20:55  Ca    8.0<L>      21 Feb 2020 05:10    TPro  6.9  /  Alb  3.3  /  TBili  0.3  /  DBili  x   /  AST  16  /  ALT  12  /  AlkPhos  111  02-21-20 @ 20:55  TPro  5.8<L>  /  Alb  2.8<L>  /  TBili  0.2  /  DBili  x   /  AST  12  /  ALT  11  /  AlkPhos  86  02-21-20 @ 05:10            A/P: 40yo POD#2 s/p pLTCS for arrest. Pregnancy c/b PEC, adequate BP control without antihypertensive medications. PMH significant for T2DM. Patient is stable and doing well post-operatively.    - Monitor BP  - Monitor FS qAC/HS  - C/w ISS  - Appreciate Endo recs  - Continue consistent carb diet.  - Increase ambulation.  - Continue motrin, tylenol, oxycodone PRN for pain control. .     Kayleigh Yoon PGY-1  #[id number] OB Progress Note: LTCS, POD#2    S: 40yo POD#2 s/p pLTCS for arrest. Pain is well controlled. She is tolerating a regular diet and passing flatus. She is voiding spontaneously, and ambulating without difficulty. Denies CP/SOB. Denies lightheadedness/dizziness. Denies N/V. Denies HA, changes in vision, RUQ pain.     O:  Vital Signs Last 24 Hrs  T(C): 36.7 (23 Feb 2020 21:26), Max: 36.8 (23 Feb 2020 05:54)  HR: 76 (23 Feb 2020 21:26) (76 - 93)  BP: 105/65 (23 Feb 2020 21:26) (101/60 - 106/69)  RR: 20 (23 Feb 2020 21:26) (16 - 20)  SpO2: 100% (23 Feb 2020 21:26) (98% - 100%)    Physical exam:  General: NAD  Abdomen: Soft, appropriately tender  Incision: Pfannenstiel, C/D/I  Extremities: Nontender    MEDICATIONS  (STANDING):  acetaminophen   Tablet .. 975 milliGRAM(s) Oral <User Schedule>  ascorbic acid 500 milliGRAM(s) Oral daily  dextrose 5%. 1000 milliLiter(s) (50 mL/Hr) IV Continuous <Continuous>  dextrose 50% Injectable 12.5 Gram(s) IV Push once  dextrose 50% Injectable 25 Gram(s) IV Push once  dextrose 50% Injectable 25 Gram(s) IV Push once  diphtheria/tetanus/pertussis (acellular) Vaccine (ADAcel) 0.5 milliLiter(s) IntraMuscular once  ferrous    sulfate 325 milliGRAM(s) Oral three times a day  heparin  Injectable 5000 Unit(s) SubCutaneous every 12 hours  ibuprofen  Tablet. 600 milliGRAM(s) Oral every 6 hours  insulin lispro (HumaLOG) corrective regimen sliding scale   SubCutaneous three times a day before meals  insulin lispro (HumaLOG) corrective regimen sliding scale   SubCutaneous at bedtime  prenatal multivitamin 1 Tablet(s) Oral daily      MEDICATIONS  (PRN):  dextrose 40% Gel 15 Gram(s) Oral once PRN Blood Glucose LESS THAN 70 milliGRAM(s)/deciliter  diphenhydrAMINE 25 milliGRAM(s) Oral every 6 hours PRN Itching  glucagon  Injectable 1 milliGRAM(s) IntraMuscular once PRN Glucose LESS THAN 70 milligrams/deciliter  glycerin Suppository - Adult 1 Suppository(s) Rectal at bedtime PRN Constipation  lanolin Ointment 1 Application(s) Topical every 6 hours PRN Sore Nipples  magnesium hydroxide Suspension 30 milliLiter(s) Oral two times a day PRN Constipation  oxyCODONE    IR 5 milliGRAM(s) Oral once PRN Moderate to Severe Pain (4-10)  oxyCODONE    IR 5 milliGRAM(s) Oral every 3 hours PRN Moderate to Severe Pain (4-10)  simethicone 80 milliGRAM(s) Chew every 4 hours PRN Gas      Labs:  Blood type: A Positive  Rubella IgG: Positive (02-10 @ 17:36)  RPR: Negative                          9.2<L>   10.25 >-----------< 252    ( 02-23 @ 05:52 )             27.8<L>                        11.1<L>   11.41<H> >-----------< 293    ( 02-21 @ 20:55 )             34.9                        9.9<L>   8.20 >-----------< 265    ( 02-21 @ 05:10 )             28.6<L>    02-21-20 @ 20:55      138  |  107  |  9   ----------------------------<  109<H>  3.7   |  19<L>  |  0.66    02-21-20 @ 05:10      136  |  108<H>  |  6<L>  ----------------------------<  114<H>  3.2<L>   |  16<L>  |  0.45<L>        Ca    9.1      21 Feb 2020 20:55  Ca    8.0<L>      21 Feb 2020 05:10    TPro  6.9  /  Alb  3.3  /  TBili  0.3  /  DBili  x   /  AST  16  /  ALT  12  /  AlkPhos  111  02-21-20 @ 20:55  TPro  5.8<L>  /  Alb  2.8<L>  /  TBili  0.2  /  DBili  x   /  AST  12  /  ALT  11  /  AlkPhos  86  02-21-20 @ 05:10

## 2020-02-24 NOTE — CHART NOTE - NSCHARTNOTEFT_GEN_A_CORE
Asked to see a patient with c/o pain, and stating that she had questions.    S/P Primary C/S on 2/22/20, for arrest.  QBL - 612  Hx. Type 2 DM  Hx. PEC, HELLP - wnl  VSS, Afebrile    Found patient in her room, in no apparent distress,   Currently pumping, standing up in the corner, by the bathroom.  Encouraged the patient to possibly sit and pump, so that she is more comfortable.  C/O some constipation. Passing flatus  Patient states that she is standing because she initially was uncomfortable, but feels better now that she had Oxycodone.  Offered the abdominal binder, which was placed to the pt's comfort.  LE - (+) Edema. No calf pain, tenderness or redness.  Patient in stable condition    Plan - Continue routine Postop Care, Pain meds as needed, MOM/Senna as ordered  Continue to follow.

## 2020-02-25 VITALS
RESPIRATION RATE: 18 BRPM | SYSTOLIC BLOOD PRESSURE: 102 MMHG | TEMPERATURE: 98 F | DIASTOLIC BLOOD PRESSURE: 58 MMHG | HEART RATE: 77 BPM | OXYGEN SATURATION: 100 %

## 2020-02-25 LAB — GLUCOSE BLDC GLUCOMTR-MCNC: 94 MG/DL — SIGNIFICANT CHANGE UP (ref 70–99)

## 2020-02-25 PROCEDURE — 99231 SBSQ HOSP IP/OBS SF/LOW 25: CPT

## 2020-02-25 RX ADMIN — Medication 600 MILLIGRAM(S): at 06:06

## 2020-02-25 RX ADMIN — Medication 325 MILLIGRAM(S): at 06:06

## 2020-02-25 RX ADMIN — HEPARIN SODIUM 5000 UNIT(S): 5000 INJECTION INTRAVENOUS; SUBCUTANEOUS at 06:06

## 2020-02-25 RX ADMIN — Medication 975 MILLIGRAM(S): at 03:16

## 2020-02-25 RX ADMIN — Medication 600 MILLIGRAM(S): at 00:18

## 2020-02-25 RX ADMIN — Medication 600 MILLIGRAM(S): at 07:00

## 2020-02-25 RX ADMIN — Medication 600 MILLIGRAM(S): at 11:22

## 2020-02-25 RX ADMIN — Medication 600 MILLIGRAM(S): at 12:00

## 2020-02-25 RX ADMIN — Medication 975 MILLIGRAM(S): at 04:16

## 2020-02-25 RX ADMIN — Medication 600 MILLIGRAM(S): at 01:00

## 2020-02-25 NOTE — PROGRESS NOTE ADULT - PROBLEM SELECTOR PROBLEM 1
delivery delivered
 delivery delivered
Type 2 diabetes mellitus without complication, with long-term current use of insulin
 delivery delivered

## 2020-02-25 NOTE — PROGRESS NOTE ADULT - ASSESSMENT
39 year old woman with T2DM, currently well controlled, post .     T2DM -   Discussed with patient and  that insulin requirements drop drastically postpartum.   If glucose was well controlled, without insulin before pregancy then she will not likely require insulin afterwards.    Glucose currently well controlled on correctional insulin only.    Recommend continuing Humalog correctional scale with POC glucose before meals and at bedtime while inpatient    Counseled patient - for discharge:   If glucose values start to rise, can resume metformin and f/u with PCP or endocrinologist  If patient prefers to follow up with Endocrinology post discharge, she prefers [place] location ( Dr Alejandro Hernandez or Dr Christi Salazar)  [phone]
A/P: 38yo with PEC now POD#1 s/p LTCS.  Patient is stable and doing well post-operatively.
A/P: 40yo with PEC and T2DM now POD#3 s/p pLTCS for arrest.  Patient is stable and doing well post-operatively.
s/p 1o c/s
s/p 1o c/s for arrest of labor
40yo POD#2 s/p pLTCS for arrest. Pregnancy c/b PEC, adequate BP control without antihypertensive medications. PMH significant for T2DM. Patient is stable and doing well post-operatively.

## 2020-02-25 NOTE — PROGRESS NOTE ADULT - SUBJECTIVE AND OBJECTIVE BOX
OB Progress Note:  Delivery, POD#3    S: 38yo with PEC and T2DM now POD#3 s/p pLTCS for arrest. Her pain is well controlled. She is tolerating a regular diet and passing flatus. Denies N/V. Denies CP/SOB/lightheadedness/dizziness. Endorses light vaginal bleeding, less than one pad per hour. She is ambulating without difficulty. Voiding spontaneously.     O:   Vital Signs Last 24 Hrs  T(C): 36.9 (2020 05:32), Max: 36.9 (2020 05:32)  T(F): 98.4 (2020 05:32), Max: 98.4 (2020 05:32)  HR: 77 (2020 05:32) (77 - 85)  BP: 102/58 (2020 05:32) (101/57 - 107/58)  BP(mean): --  RR: 18 (2020 05:32) (17 - 18)  SpO2: 100% (2020 05:32) (100% - 100%)    PE:  General: NAD  Heart: extremities well-perfused  Lungs: breathing comfortably  Abdomen: Mildly distended, appropriately tender, fundus firm, incision with violaceous border but no induration, fluctuance or drainage  Extremities: No erythema, no pitting edema    Labs:  Blood type: A Positive  Rubella IgG: Positive (02-10 @ 17:36)  RPR: Negative                          9.2<L>   10.25 >-----------< 252    (  @ 05:52 )             27.8<L>

## 2020-02-25 NOTE — PROGRESS NOTE ADULT - PROBLEM SELECTOR PLAN 1
- BPs well controlled  - FS well-controlled. Continue ISS.  - Continue regular diet.  - Increase ambulation.  - Continue motrin, tylenol, oxycodone PRN for pain control.    - Patient asymptomatic with no evidence of ongoing bleeding.  - Discharge planning    Mago Mckeon, PGY-1
- Monitor BPs, FS  - Cont ISS; Endo to see patient 2/23  - Continue regular diet.  - Increase ambulation.  - Continue motrin, tylenol, oxycodone PRN for pain control.   - F/u AM CBC    Robyn Frankel PGY-1
- Monitor BP  - Monitor FS qAC/HS  - C/w ISS  - Appreciate Endo recs  - Continue consistent carb diet.  - Increase ambulation.  - Continue motrin, tylenol, oxycodone PRN for pain control. .     Kayleigh Yoon PGY-1  #[id number]

## 2020-02-25 NOTE — PROGRESS NOTE ADULT - SUBJECTIVE AND OBJECTIVE BOX
Chief Complaint: t2dm in pregnancy    History: denies n/v, tolerates po , no hypoglycemia- being dc'd home    MEDICATIONS  (STANDING):  acetaminophen   Tablet .. 975 milliGRAM(s) Oral <User Schedule>  ascorbic acid 500 milliGRAM(s) Oral daily  dextrose 5%. 1000 milliLiter(s) (50 mL/Hr) IV Continuous <Continuous>  dextrose 50% Injectable 12.5 Gram(s) IV Push once  dextrose 50% Injectable 25 Gram(s) IV Push once  dextrose 50% Injectable 25 Gram(s) IV Push once  diphtheria/tetanus/pertussis (acellular) Vaccine (ADAcel) 0.5 milliLiter(s) IntraMuscular once  ferrous    sulfate 325 milliGRAM(s) Oral three times a day  heparin  Injectable 5000 Unit(s) SubCutaneous every 12 hours  ibuprofen  Tablet. 600 milliGRAM(s) Oral every 6 hours  insulin lispro (HumaLOG) corrective regimen sliding scale   SubCutaneous three times a day before meals  insulin lispro (HumaLOG) corrective regimen sliding scale   SubCutaneous at bedtime  prenatal multivitamin 1 Tablet(s) Oral daily    MEDICATIONS  (PRN):  dextrose 40% Gel 15 Gram(s) Oral once PRN Blood Glucose LESS THAN 70 milliGRAM(s)/deciliter  diphenhydrAMINE 25 milliGRAM(s) Oral every 6 hours PRN Itching  glucagon  Injectable 1 milliGRAM(s) IntraMuscular once PRN Glucose LESS THAN 70 milligrams/deciliter  glycerin Suppository - Adult 1 Suppository(s) Rectal at bedtime PRN Constipation  lanolin Ointment 1 Application(s) Topical every 6 hours PRN Sore Nipples  magnesium hydroxide Suspension 30 milliLiter(s) Oral two times a day PRN Constipation  oxyCODONE    IR 5 milliGRAM(s) Oral once PRN Moderate to Severe Pain (4-10)  oxyCODONE    IR 5 milliGRAM(s) Oral every 3 hours PRN Moderate to Severe Pain (4-10)  senna 1 Tablet(s) Oral two times a day PRN Constipation  simethicone 80 milliGRAM(s) Chew every 4 hours PRN Gas      Allergies    No Known Allergies    Intolerances      Review of Systems:  Constitutional: No fever  Eyes: No blurry vision      ALL OTHER SYSTEMS REVIEWED AND NEGATIVE        PHYSICAL EXAM:  VITALS: T(C): 36.9 (02-25-20 @ 05:32)  T(F): 98.4 (02-25-20 @ 05:32), Max: 98.4 (02-25-20 @ 05:32)  HR: 77 (02-25-20 @ 05:32) (77 - 79)  BP: 102/58 (02-25-20 @ 05:32) (101/57 - 102/58)  RR:  (17 - 18)  SpO2:  (100% - 100%)  Wt(kg): --  GENERAL: NAD,  well-developed  SKIN: Dry, intact, No rashes or lesions  PSYCH: Alert and oriented x 3, normal affect, normal mood      CAPILLARY BLOOD GLUCOSE  POCT Blood Glucose.: 94 mg/dL (25 Feb 2020 07:37)  POCT Blood Glucose.: 199 mg/dL (24 Feb 2020 21:21)  POCT Blood Glucose.: 164 mg/dL (24 Feb 2020 16:54)                  Thyroid Function Tests:      Hemoglobin A1C, Whole Blood: 5.9 % <H> [4.0 - 5.6] (02-11-20 @ 05:59)

## 2020-02-26 DIAGNOSIS — O24.113 PRE-EXISTING TYPE 2 DIABETES MELLITUS, IN PREGNANCY, THIRD TRIMESTER: ICD-10-CM

## 2020-02-26 DIAGNOSIS — O09.523 SUPERVISION OF ELDERLY MULTIGRAVIDA, THIRD TRIMESTER: ICD-10-CM

## 2020-02-26 DIAGNOSIS — O16.3 UNSPECIFIED MATERNAL HYPERTENSION, THIRD TRIMESTER: ICD-10-CM

## 2021-10-25 NOTE — OB RN PATIENT PROFILE - AS HEIGHT TYPE
Pt assessed. Pt is awake and alert. Denies any pain or discomfort. Insulin drip initiated; FS monitored. Cardiac and 02 monitoring maintained. Fall precautions maintained. Repeat labs and UA sent. Safety and comfort maintained stated

## 2024-11-10 NOTE — OB RN DELIVERY SUMMARY - NS_MECONIUTRACHA_OBGYN_ALL_OB
[FreeTextEntry1] : This note was partly authored by Sam Radford working as a scribe for VIGNESH Cannon. I, VIGNESH Cannon, have reviewed the content of this note and confirm it is true and accurate. I personally performed the history and physical examination and made all the decisions. 11/07/2024.  None

## 2025-05-28 NOTE — PROGRESS NOTE ADULT - PROBLEM/PLAN-1
----- Message from Raymond sent at 5/23/2025 12:56 PM CDT -----  Type: Patient Call Back Who called: Self  What is the request in detail: pt is calling to est care for her daughter with the provider. I let the pt know the provider panels are currently closed for new pt's but she would still like to consult with the provider about excepting her daughter as a new pt.  Can the clinic reply by [facility]? Would the patient rather a call back or a response via [facility]? Call back  Best call back number: [phone] Additional Information:  
I highly recommend Maura Nye and Dr. Yanez - please help schedule her daughter with them first available   
DISPLAY PLAN FREE TEXT